# Patient Record
Sex: MALE | Race: WHITE | NOT HISPANIC OR LATINO | Employment: OTHER | URBAN - METROPOLITAN AREA
[De-identification: names, ages, dates, MRNs, and addresses within clinical notes are randomized per-mention and may not be internally consistent; named-entity substitution may affect disease eponyms.]

---

## 2024-09-12 ENCOUNTER — APPOINTMENT (OUTPATIENT)
Dept: RADIOLOGY | Facility: HOSPITAL | Age: 69
DRG: 871 | End: 2024-09-12
Payer: MEDICARE

## 2024-09-12 ENCOUNTER — HOSPITAL ENCOUNTER (INPATIENT)
Facility: HOSPITAL | Age: 69
DRG: 871 | End: 2024-09-12
Attending: INTERNAL MEDICINE | Admitting: SURGERY
Payer: MEDICARE

## 2024-09-12 ENCOUNTER — PREP FOR PROCEDURE (OUTPATIENT)
Dept: OPERATING ROOM | Facility: HOSPITAL | Age: 69
End: 2024-09-12
Payer: MEDICARE

## 2024-09-12 ENCOUNTER — APPOINTMENT (OUTPATIENT)
Dept: CARDIOLOGY | Facility: HOSPITAL | Age: 69
DRG: 871 | End: 2024-09-12
Payer: MEDICARE

## 2024-09-12 ENCOUNTER — DOCUMENTATION (OUTPATIENT)
Dept: OPERATING ROOM | Facility: HOSPITAL | Age: 69
End: 2024-09-12
Payer: MEDICARE

## 2024-09-12 DIAGNOSIS — N17.9 AKI (ACUTE KIDNEY INJURY) (CMS-HCC): ICD-10-CM

## 2024-09-12 DIAGNOSIS — N20.1 OBSTRUCTION OF LEFT URETEROPELVIC JUNCTION (UPJ) DUE TO STONE: ICD-10-CM

## 2024-09-12 DIAGNOSIS — N20.1 OBSTRUCTION OF LEFT URETEROPELVIC JUNCTION (UPJ) DUE TO STONE: Primary | ICD-10-CM

## 2024-09-12 DIAGNOSIS — N20.0 KIDNEY STONE: Primary | ICD-10-CM

## 2024-09-12 DIAGNOSIS — A41.9 SEVERE SEPSIS: ICD-10-CM

## 2024-09-12 DIAGNOSIS — Z43.6 ATTENTION TO NEPHROSTOMY (MULTI): ICD-10-CM

## 2024-09-12 DIAGNOSIS — R65.20 SEVERE SEPSIS: ICD-10-CM

## 2024-09-12 LAB
ALBUMIN SERPL BCP-MCNC: 2.7 G/DL (ref 3.4–5)
ALBUMIN SERPL BCP-MCNC: 3.4 G/DL (ref 3.4–5)
ALP SERPL-CCNC: 77 U/L (ref 33–136)
ALT SERPL W P-5'-P-CCNC: 8 U/L (ref 10–52)
ANION GAP BLDA CALCULATED.4IONS-SCNC: 16 MMO/L (ref 10–25)
ANION GAP BLDA CALCULATED.4IONS-SCNC: 21 MMO/L (ref 10–25)
ANION GAP BLDV CALCULATED.4IONS-SCNC: 15 MMOL/L (ref 10–25)
ANION GAP SERPL CALC-SCNC: 17 MMOL/L (ref 10–20)
ANION GAP SERPL CALC-SCNC: 17 MMOL/L (ref 10–20)
APPEARANCE UR: ABNORMAL
AST SERPL W P-5'-P-CCNC: 28 U/L (ref 9–39)
BACTERIA #/AREA URNS AUTO: ABNORMAL /HPF
BASE EXCESS BLDA CALC-SCNC: -10.3 MMOL/L (ref -2–3)
BASE EXCESS BLDA CALC-SCNC: -11.4 MMOL/L (ref -2–3)
BASE EXCESS BLDV CALC-SCNC: -1 MMOL/L (ref -2–3)
BASOPHILS # BLD AUTO: 0.02 X10*3/UL (ref 0–0.1)
BASOPHILS NFR BLD AUTO: 0.8 %
BILIRUB SERPL-MCNC: 2.2 MG/DL (ref 0–1.2)
BILIRUB UR STRIP.AUTO-MCNC: NEGATIVE MG/DL
BNP SERPL-MCNC: 98 PG/ML (ref 0–99)
BODY TEMPERATURE: 37 DEGREES CELSIUS
BUN SERPL-MCNC: 32 MG/DL (ref 6–23)
BUN SERPL-MCNC: 35 MG/DL (ref 6–23)
CA-I BLDA-SCNC: 0.96 MMOL/L (ref 1.1–1.33)
CA-I BLDA-SCNC: 0.99 MMOL/L (ref 1.1–1.33)
CA-I BLDV-SCNC: 1.15 MMOL/L (ref 1.1–1.33)
CALCIUM SERPL-MCNC: 7 MG/DL (ref 8.6–10.3)
CALCIUM SERPL-MCNC: 8.4 MG/DL (ref 8.6–10.3)
CARDIAC TROPONIN I PNL SERPL HS: 50 NG/L (ref 0–20)
CHLORIDE BLDA-SCNC: 104 MMOL/L (ref 98–107)
CHLORIDE BLDA-SCNC: 109 MMOL/L (ref 98–107)
CHLORIDE BLDV-SCNC: 102 MMOL/L (ref 98–107)
CHLORIDE SERPL-SCNC: 103 MMOL/L (ref 98–107)
CHLORIDE SERPL-SCNC: 105 MMOL/L (ref 98–107)
CO2 SERPL-SCNC: 16 MMOL/L (ref 21–32)
CO2 SERPL-SCNC: 21 MMOL/L (ref 21–32)
COLOR UR: ABNORMAL
CREAT SERPL-MCNC: 2.32 MG/DL (ref 0.5–1.3)
CREAT SERPL-MCNC: 2.95 MG/DL (ref 0.5–1.3)
EGFRCR SERPLBLD CKD-EPI 2021: 22 ML/MIN/1.73M*2
EGFRCR SERPLBLD CKD-EPI 2021: 30 ML/MIN/1.73M*2
EOSINOPHIL # BLD AUTO: 0.25 X10*3/UL (ref 0–0.7)
EOSINOPHIL NFR BLD AUTO: 10.1 %
ERYTHROCYTE [DISTWIDTH] IN BLOOD BY AUTOMATED COUNT: 13.8 % (ref 11.5–14.5)
FLUAV RNA RESP QL NAA+PROBE: NOT DETECTED
FLUBV RNA RESP QL NAA+PROBE: NOT DETECTED
GLUCOSE BLD MANUAL STRIP-MCNC: 137 MG/DL (ref 74–99)
GLUCOSE BLD MANUAL STRIP-MCNC: 67 MG/DL (ref 74–99)
GLUCOSE BLDA-MCNC: 114 MG/DL (ref 74–99)
GLUCOSE BLDA-MCNC: 154 MG/DL (ref 74–99)
GLUCOSE BLDV-MCNC: 136 MG/DL (ref 74–99)
GLUCOSE SERPL-MCNC: 128 MG/DL (ref 74–99)
GLUCOSE SERPL-MCNC: 142 MG/DL (ref 74–99)
GLUCOSE UR STRIP.AUTO-MCNC: NORMAL MG/DL
HCO3 BLDA-SCNC: 13.3 MMOL/L (ref 22–26)
HCO3 BLDA-SCNC: 13.6 MMOL/L (ref 22–26)
HCO3 BLDV-SCNC: 22 MMOL/L (ref 22–26)
HCT VFR BLD AUTO: 36.4 % (ref 41–52)
HCT VFR BLD EST: 31 % (ref 41–52)
HCT VFR BLD EST: 39 % (ref 41–52)
HCT VFR BLD EST: 43 % (ref 41–52)
HGB BLD-MCNC: 12.5 G/DL (ref 13.5–17.5)
HGB BLDA-MCNC: 10.4 G/DL (ref 13.5–17.5)
HGB BLDA-MCNC: 14.3 G/DL (ref 13.5–17.5)
HGB BLDV-MCNC: 13.1 G/DL (ref 13.5–17.5)
HOLD SPECIMEN: NORMAL
HOLD SPECIMEN: NORMAL
IMM GRANULOCYTES # BLD AUTO: 0.01 X10*3/UL (ref 0–0.7)
IMM GRANULOCYTES NFR BLD AUTO: 0.4 % (ref 0–0.9)
INHALED O2 CONCENTRATION: 24 %
INHALED O2 CONCENTRATION: 30 %
INHALED O2 CONCENTRATION: 36 %
INR PPP: 1.7 (ref 0.9–1.1)
KETONES UR STRIP.AUTO-MCNC: ABNORMAL MG/DL
LACTATE BLDA-SCNC: 3.5 MMOL/L (ref 0.4–2)
LACTATE BLDA-SCNC: 5.7 MMOL/L (ref 0.4–2)
LACTATE BLDV-SCNC: 4.3 MMOL/L (ref 0.4–2)
LACTATE SERPL-SCNC: 4 MMOL/L (ref 0.4–2)
LACTATE SERPL-SCNC: 4.4 MMOL/L (ref 0.4–2)
LACTATE SERPL-SCNC: 4.6 MMOL/L (ref 0.4–2)
LEUKOCYTE ESTERASE UR QL STRIP.AUTO: ABNORMAL
LIPASE SERPL-CCNC: 27 U/L (ref 9–82)
LYMPHOCYTES # BLD AUTO: 0.14 X10*3/UL (ref 1.2–4.8)
LYMPHOCYTES NFR BLD AUTO: 5.7 %
MCH RBC QN AUTO: 34 PG (ref 26–34)
MCHC RBC AUTO-ENTMCNC: 34.3 G/DL (ref 32–36)
MCV RBC AUTO: 99 FL (ref 80–100)
MONOCYTES # BLD AUTO: 0.04 X10*3/UL (ref 0.1–1)
MONOCYTES NFR BLD AUTO: 1.6 %
MUCOUS THREADS #/AREA URNS AUTO: ABNORMAL /LPF
NEUTROPHILS # BLD AUTO: 2.01 X10*3/UL (ref 1.2–7.7)
NEUTROPHILS NFR BLD AUTO: 81.4 %
NITRITE UR QL STRIP.AUTO: NEGATIVE
NRBC BLD-RTO: 3.2 /100 WBCS (ref 0–0)
OXYHGB MFR BLDA: 90.7 % (ref 94–98)
OXYHGB MFR BLDA: 92.3 % (ref 94–98)
OXYHGB MFR BLDV: 65.5 % (ref 45–75)
PCO2 BLDA: 24 MM HG (ref 38–42)
PCO2 BLDA: 27 MM HG (ref 38–42)
PCO2 BLDV: 31 MM HG (ref 41–51)
PH BLDA: 7.3 PH (ref 7.38–7.42)
PH BLDA: 7.36 PH (ref 7.38–7.42)
PH BLDV: 7.46 PH (ref 7.33–7.43)
PH UR STRIP.AUTO: 5.5 [PH]
PHOSPHATE SERPL-MCNC: 3.2 MG/DL (ref 2.5–4.9)
PLATELET # BLD AUTO: 184 X10*3/UL (ref 150–450)
PO2 BLDA: 70 MM HG (ref 85–95)
PO2 BLDA: 70 MM HG (ref 85–95)
PO2 BLDV: 39 MM HG (ref 35–45)
POTASSIUM BLDA-SCNC: 2.9 MMOL/L (ref 3.5–5.3)
POTASSIUM BLDA-SCNC: 4 MMOL/L (ref 3.5–5.3)
POTASSIUM BLDV-SCNC: 4.2 MMOL/L (ref 3.5–5.3)
POTASSIUM SERPL-SCNC: 3.9 MMOL/L (ref 3.5–5.3)
POTASSIUM SERPL-SCNC: 4.1 MMOL/L (ref 3.5–5.3)
PROT SERPL-MCNC: 5.6 G/DL (ref 6.4–8.2)
PROT UR STRIP.AUTO-MCNC: ABNORMAL MG/DL
PROTHROMBIN TIME: 19.5 SECONDS (ref 9.8–12.8)
RBC # BLD AUTO: 3.68 X10*6/UL (ref 4.5–5.9)
RBC # UR STRIP.AUTO: ABNORMAL /UL
RBC #/AREA URNS AUTO: >20 /HPF
SAO2 % BLDA: 93 % (ref 94–100)
SAO2 % BLDA: 94 % (ref 94–100)
SAO2 % BLDV: 68 % (ref 45–75)
SARS-COV-2 RNA RESP QL NAA+PROBE: NOT DETECTED
SODIUM BLDA-SCNC: 134 MMOL/L (ref 136–145)
SODIUM BLDA-SCNC: 136 MMOL/L (ref 136–145)
SODIUM BLDV-SCNC: 135 MMOL/L (ref 136–145)
SODIUM SERPL-SCNC: 134 MMOL/L (ref 136–145)
SODIUM SERPL-SCNC: 137 MMOL/L (ref 136–145)
SP GR UR STRIP.AUTO: 1.02
UROBILINOGEN UR STRIP.AUTO-MCNC: NORMAL MG/DL
WBC # BLD AUTO: 2.5 X10*3/UL (ref 4.4–11.3)
WBC #/AREA URNS AUTO: >50 /HPF

## 2024-09-12 PROCEDURE — 2500000004 HC RX 250 GENERAL PHARMACY W/ HCPCS (ALT 636 FOR OP/ED)

## 2024-09-12 PROCEDURE — 99222 1ST HOSP IP/OBS MODERATE 55: CPT | Performed by: NURSE PRACTITIONER

## 2024-09-12 PROCEDURE — 99291 CRITICAL CARE FIRST HOUR: CPT | Performed by: INTERNAL MEDICINE

## 2024-09-12 PROCEDURE — 85025 COMPLETE CBC W/AUTO DIFF WBC: CPT | Performed by: INTERNAL MEDICINE

## 2024-09-12 PROCEDURE — 36415 COLL VENOUS BLD VENIPUNCTURE: CPT | Performed by: INTERNAL MEDICINE

## 2024-09-12 PROCEDURE — 0T9430Z DRAINAGE OF LEFT KIDNEY PELVIS WITH DRAINAGE DEVICE, PERCUTANEOUS APPROACH: ICD-10-PCS | Performed by: RADIOLOGY

## 2024-09-12 PROCEDURE — 83605 ASSAY OF LACTIC ACID: CPT | Performed by: INTERNAL MEDICINE

## 2024-09-12 PROCEDURE — 81001 URINALYSIS AUTO W/SCOPE: CPT | Performed by: INTERNAL MEDICINE

## 2024-09-12 PROCEDURE — 36556 INSERT NON-TUNNEL CV CATH: CPT | Performed by: NURSE PRACTITIONER

## 2024-09-12 PROCEDURE — 2500000005 HC RX 250 GENERAL PHARMACY W/O HCPCS: Performed by: INTERNAL MEDICINE

## 2024-09-12 PROCEDURE — 96374 THER/PROPH/DIAG INJ IV PUSH: CPT | Mod: 59

## 2024-09-12 PROCEDURE — 84484 ASSAY OF TROPONIN QUANT: CPT | Performed by: INTERNAL MEDICINE

## 2024-09-12 PROCEDURE — 71045 X-RAY EXAM CHEST 1 VIEW: CPT

## 2024-09-12 PROCEDURE — 02HV33Z INSERTION OF INFUSION DEVICE INTO SUPERIOR VENA CAVA, PERCUTANEOUS APPROACH: ICD-10-PCS | Performed by: NURSE PRACTITIONER

## 2024-09-12 PROCEDURE — 2500000001 HC RX 250 WO HCPCS SELF ADMINISTERED DRUGS (ALT 637 FOR MEDICARE OP): Performed by: NURSE PRACTITIONER

## 2024-09-12 PROCEDURE — 96365 THER/PROPH/DIAG IV INF INIT: CPT | Mod: 59

## 2024-09-12 PROCEDURE — 36620 INSERTION CATHETER ARTERY: CPT | Performed by: NURSE PRACTITIONER

## 2024-09-12 PROCEDURE — 37799 UNLISTED PX VASCULAR SURGERY: CPT | Performed by: NURSE PRACTITIONER

## 2024-09-12 PROCEDURE — 2550000001 HC RX 255 CONTRASTS: Performed by: INTERNAL MEDICINE

## 2024-09-12 PROCEDURE — 2720000007 HC OR 272 NO HCPCS

## 2024-09-12 PROCEDURE — 71045 X-RAY EXAM CHEST 1 VIEW: CPT | Performed by: RADIOLOGY

## 2024-09-12 PROCEDURE — 99152 MOD SED SAME PHYS/QHP 5/>YRS: CPT

## 2024-09-12 PROCEDURE — 96366 THER/PROPH/DIAG IV INF ADDON: CPT | Mod: 59

## 2024-09-12 PROCEDURE — 2500000004 HC RX 250 GENERAL PHARMACY W/ HCPCS (ALT 636 FOR OP/ED): Performed by: INTERNAL MEDICINE

## 2024-09-12 PROCEDURE — 87086 URINE CULTURE/COLONY COUNT: CPT | Mod: AHULAB | Performed by: INTERNAL MEDICINE

## 2024-09-12 PROCEDURE — 2500000005 HC RX 250 GENERAL PHARMACY W/O HCPCS: Performed by: EMERGENCY MEDICINE

## 2024-09-12 PROCEDURE — 76942 ECHO GUIDE FOR BIOPSY: CPT

## 2024-09-12 PROCEDURE — 84132 ASSAY OF SERUM POTASSIUM: CPT | Performed by: NURSE PRACTITIONER

## 2024-09-12 PROCEDURE — 87635 SARS-COV-2 COVID-19 AMP PRB: CPT | Performed by: INTERNAL MEDICINE

## 2024-09-12 PROCEDURE — 71046 X-RAY EXAM CHEST 2 VIEWS: CPT

## 2024-09-12 PROCEDURE — 83880 ASSAY OF NATRIURETIC PEPTIDE: CPT | Performed by: INTERNAL MEDICINE

## 2024-09-12 PROCEDURE — 99291 CRITICAL CARE FIRST HOUR: CPT | Performed by: NURSE PRACTITIONER

## 2024-09-12 PROCEDURE — 2020000001 HC ICU ROOM DAILY

## 2024-09-12 PROCEDURE — 85610 PROTHROMBIN TIME: CPT

## 2024-09-12 PROCEDURE — 71045 X-RAY EXAM CHEST 1 VIEW: CPT | Performed by: STUDENT IN AN ORGANIZED HEALTH CARE EDUCATION/TRAINING PROGRAM

## 2024-09-12 PROCEDURE — C1894 INTRO/SHEATH, NON-LASER: HCPCS

## 2024-09-12 PROCEDURE — 93005 ELECTROCARDIOGRAM TRACING: CPT

## 2024-09-12 PROCEDURE — 96367 TX/PROPH/DG ADDL SEQ IV INF: CPT | Mod: 59

## 2024-09-12 PROCEDURE — 50432 PLMT NEPHROSTOMY CATHETER: CPT

## 2024-09-12 PROCEDURE — 83690 ASSAY OF LIPASE: CPT | Performed by: INTERNAL MEDICINE

## 2024-09-12 PROCEDURE — 2500000005 HC RX 250 GENERAL PHARMACY W/O HCPCS: Performed by: NURSE PRACTITIONER

## 2024-09-12 PROCEDURE — 96361 HYDRATE IV INFUSION ADD-ON: CPT | Mod: 59

## 2024-09-12 PROCEDURE — 2500000004 HC RX 250 GENERAL PHARMACY W/ HCPCS (ALT 636 FOR OP/ED): Performed by: NURSE PRACTITIONER

## 2024-09-12 PROCEDURE — 84132 ASSAY OF SERUM POTASSIUM: CPT | Performed by: INTERNAL MEDICINE

## 2024-09-12 PROCEDURE — 87077 CULTURE AEROBIC IDENTIFY: CPT | Mod: AHULAB | Performed by: INTERNAL MEDICINE

## 2024-09-12 PROCEDURE — 2550000001 HC RX 255 CONTRASTS: Performed by: RADIOLOGY

## 2024-09-12 PROCEDURE — 74177 CT ABD & PELVIS W/CONTRAST: CPT | Performed by: RADIOLOGY

## 2024-09-12 PROCEDURE — 2500000004 HC RX 250 GENERAL PHARMACY W/ HCPCS (ALT 636 FOR OP/ED): Performed by: RADIOLOGY

## 2024-09-12 PROCEDURE — 94640 AIRWAY INHALATION TREATMENT: CPT

## 2024-09-12 PROCEDURE — C1729 CATH, DRAINAGE: HCPCS

## 2024-09-12 PROCEDURE — 2500000004 HC RX 250 GENERAL PHARMACY W/ HCPCS (ALT 636 FOR OP/ED): Performed by: EMERGENCY MEDICINE

## 2024-09-12 PROCEDURE — 82947 ASSAY GLUCOSE BLOOD QUANT: CPT

## 2024-09-12 PROCEDURE — 2500000005 HC RX 250 GENERAL PHARMACY W/O HCPCS: Performed by: RADIOLOGY

## 2024-09-12 PROCEDURE — 50432 PLMT NEPHROSTOMY CATHETER: CPT | Performed by: RADIOLOGY

## 2024-09-12 PROCEDURE — 71046 X-RAY EXAM CHEST 2 VIEWS: CPT | Performed by: RADIOLOGY

## 2024-09-12 PROCEDURE — 74177 CT ABD & PELVIS W/CONTRAST: CPT

## 2024-09-12 PROCEDURE — 2500000002 HC RX 250 W HCPCS SELF ADMINISTERED DRUGS (ALT 637 FOR MEDICARE OP, ALT 636 FOR OP/ED): Performed by: NURSE PRACTITIONER

## 2024-09-12 RX ORDER — HEPARIN SODIUM 5000 [USP'U]/ML
5000 INJECTION, SOLUTION INTRAVENOUS; SUBCUTANEOUS EVERY 8 HOURS
Status: DISCONTINUED | OUTPATIENT
Start: 2024-09-12 | End: 2024-09-17 | Stop reason: HOSPADM

## 2024-09-12 RX ORDER — CARBIDOPA AND LEVODOPA 25; 100 MG/1; MG/1
1.5 TABLET ORAL EVERY 8 HOURS
Status: DISCONTINUED | OUTPATIENT
Start: 2024-09-12 | End: 2024-09-12

## 2024-09-12 RX ORDER — ASPIRIN 81 MG/1
81 TABLET ORAL DAILY
COMMUNITY

## 2024-09-12 RX ORDER — IPRATROPIUM BROMIDE AND ALBUTEROL SULFATE 2.5; .5 MG/3ML; MG/3ML
3 SOLUTION RESPIRATORY (INHALATION) EVERY 6 HOURS PRN
Status: DISCONTINUED | OUTPATIENT
Start: 2024-09-12 | End: 2024-09-15

## 2024-09-12 RX ORDER — NOREPINEPHRINE BITARTRATE/D5W 8 MG/250ML
0-.5 PLASTIC BAG, INJECTION (ML) INTRAVENOUS CONTINUOUS
Status: DISCONTINUED | OUTPATIENT
Start: 2024-09-12 | End: 2024-09-15

## 2024-09-12 RX ORDER — ACETAMINOPHEN 325 MG/1
650 TABLET ORAL EVERY 4 HOURS PRN
Status: DISCONTINUED | OUTPATIENT
Start: 2024-09-12 | End: 2024-09-17 | Stop reason: HOSPADM

## 2024-09-12 RX ORDER — CARBIDOPA AND LEVODOPA 25; 100 MG/1; MG/1
1.5 TABLET ORAL 4 TIMES DAILY
Status: DISCONTINUED | OUTPATIENT
Start: 2024-09-12 | End: 2024-09-17 | Stop reason: HOSPADM

## 2024-09-12 RX ORDER — ACETAMINOPHEN 500 MG
2000 TABLET ORAL DAILY
COMMUNITY

## 2024-09-12 RX ORDER — TRAZODONE HYDROCHLORIDE 50 MG/1
50 TABLET ORAL NIGHTLY PRN
Status: DISCONTINUED | OUTPATIENT
Start: 2024-09-12 | End: 2024-09-14

## 2024-09-12 RX ORDER — ATORVASTATIN CALCIUM 40 MG/1
40 TABLET, FILM COATED ORAL NIGHTLY
Status: DISCONTINUED | OUTPATIENT
Start: 2024-09-12 | End: 2024-09-17 | Stop reason: HOSPADM

## 2024-09-12 RX ORDER — SODIUM CHLORIDE, SODIUM LACTATE, POTASSIUM CHLORIDE, CALCIUM CHLORIDE 600; 310; 30; 20 MG/100ML; MG/100ML; MG/100ML; MG/100ML
100 INJECTION, SOLUTION INTRAVENOUS CONTINUOUS
Status: DISCONTINUED | OUTPATIENT
Start: 2024-09-12 | End: 2024-09-12 | Stop reason: SDUPTHER

## 2024-09-12 RX ORDER — FAMOTIDINE 20 MG/1
20 TABLET, FILM COATED ORAL DAILY
Status: DISCONTINUED | OUTPATIENT
Start: 2024-09-12 | End: 2024-09-17 | Stop reason: HOSPADM

## 2024-09-12 RX ORDER — ASPIRIN 81 MG/1
81 TABLET ORAL DAILY
Status: DISCONTINUED | OUTPATIENT
Start: 2024-09-13 | End: 2024-09-17 | Stop reason: HOSPADM

## 2024-09-12 RX ORDER — ATORVASTATIN CALCIUM 40 MG/1
40 TABLET, FILM COATED ORAL NIGHTLY
COMMUNITY

## 2024-09-12 RX ORDER — FENTANYL CITRATE 50 UG/ML
INJECTION, SOLUTION INTRAMUSCULAR; INTRAVENOUS
Status: COMPLETED
Start: 2024-09-12 | End: 2024-09-12

## 2024-09-12 RX ORDER — POLYETHYLENE GLYCOL 3350 17 G/17G
17 POWDER, FOR SOLUTION ORAL DAILY
Status: DISCONTINUED | OUTPATIENT
Start: 2024-09-12 | End: 2024-09-17 | Stop reason: HOSPADM

## 2024-09-12 RX ORDER — CARBIDOPA AND LEVODOPA 25; 100 MG/1; MG/1
1.5 TABLET, ORALLY DISINTEGRATING ORAL 4 TIMES DAILY
COMMUNITY

## 2024-09-12 RX ORDER — ACETAMINOPHEN 325 MG/1
650 TABLET ORAL ONCE
Status: COMPLETED | OUTPATIENT
Start: 2024-09-12 | End: 2024-09-12

## 2024-09-12 RX ORDER — FENTANYL CITRATE 50 UG/ML
25 INJECTION, SOLUTION INTRAMUSCULAR; INTRAVENOUS ONCE
Status: COMPLETED | OUTPATIENT
Start: 2024-09-12 | End: 2024-09-12

## 2024-09-12 RX ORDER — PYRIDOXINE HCL (VITAMIN B6) 100 MG
100 TABLET ORAL DAILY
COMMUNITY

## 2024-09-12 RX ORDER — IPRATROPIUM BROMIDE AND ALBUTEROL SULFATE 2.5; .5 MG/3ML; MG/3ML
3 SOLUTION RESPIRATORY (INHALATION) ONCE
Status: COMPLETED | OUTPATIENT
Start: 2024-09-12 | End: 2024-09-12

## 2024-09-12 RX ORDER — ALLOPURINOL 100 MG/1
100 TABLET ORAL DAILY
COMMUNITY

## 2024-09-12 RX ORDER — IPRATROPIUM BROMIDE AND ALBUTEROL SULFATE 2.5; .5 MG/3ML; MG/3ML
3 SOLUTION RESPIRATORY (INHALATION) ONCE
Status: DISCONTINUED | OUTPATIENT
Start: 2024-09-12 | End: 2024-09-14

## 2024-09-12 RX ORDER — ACETAMINOPHEN 500 MG
1000 TABLET ORAL DAILY
COMMUNITY

## 2024-09-12 RX ORDER — DEXTROSE, SODIUM CHLORIDE, SODIUM LACTATE, POTASSIUM CHLORIDE, AND CALCIUM CHLORIDE 5; .6; .31; .03; .02 G/100ML; G/100ML; G/100ML; G/100ML; G/100ML
75 INJECTION, SOLUTION INTRAVENOUS CONTINUOUS
Status: DISCONTINUED | OUTPATIENT
Start: 2024-09-12 | End: 2024-09-12

## 2024-09-12 RX ORDER — CARBIDOPA AND LEVODOPA 25; 100 MG/1; MG/1
1.5 TABLET ORAL 4 TIMES DAILY
Status: DISCONTINUED | OUTPATIENT
Start: 2024-09-12 | End: 2024-09-12

## 2024-09-12 RX ORDER — LIDOCAINE HYDROCHLORIDE 20 MG/ML
1 JELLY TOPICAL ONCE
Status: COMPLETED | OUTPATIENT
Start: 2024-09-12 | End: 2024-09-12

## 2024-09-12 RX ORDER — TRAZODONE HYDROCHLORIDE 50 MG/1
50 TABLET ORAL NIGHTLY
COMMUNITY

## 2024-09-12 RX ORDER — CARBIDOPA AND LEVODOPA 25; 100 MG/1; MG/1
1.5 TABLET ORAL
Status: DISCONTINUED | OUTPATIENT
Start: 2024-09-12 | End: 2024-09-12 | Stop reason: WASHOUT

## 2024-09-12 RX ORDER — SODIUM CHLORIDE, SODIUM LACTATE, POTASSIUM CHLORIDE, CALCIUM CHLORIDE 600; 310; 30; 20 MG/100ML; MG/100ML; MG/100ML; MG/100ML
200 INJECTION, SOLUTION INTRAVENOUS CONTINUOUS
Status: ACTIVE | OUTPATIENT
Start: 2024-09-12 | End: 2024-09-12

## 2024-09-12 RX ORDER — FENTANYL CITRATE 50 UG/ML
INJECTION, SOLUTION INTRAMUSCULAR; INTRAVENOUS AS NEEDED
Status: DISCONTINUED | OUTPATIENT
Start: 2024-09-12 | End: 2024-09-17 | Stop reason: HOSPADM

## 2024-09-12 RX ORDER — LIDOCAINE HYDROCHLORIDE 20 MG/ML
INJECTION, SOLUTION INFILTRATION; PERINEURAL AS NEEDED
Status: DISCONTINUED | OUTPATIENT
Start: 2024-09-12 | End: 2024-09-17 | Stop reason: HOSPADM

## 2024-09-12 RX ORDER — NOREPINEPHRINE BITARTRATE/D5W 8 MG/250ML
0-.2 PLASTIC BAG, INJECTION (ML) INTRAVENOUS CONTINUOUS
Status: DISCONTINUED | OUTPATIENT
Start: 2024-09-12 | End: 2024-09-12 | Stop reason: ALTCHOICE

## 2024-09-12 RX ORDER — LISINOPRIL 10 MG/1
10 TABLET ORAL DAILY
COMMUNITY

## 2024-09-12 RX ADMIN — IOHEXOL 75 ML: 350 INJECTION, SOLUTION INTRAVENOUS at 01:28

## 2024-09-12 RX ADMIN — SODIUM CHLORIDE, POTASSIUM CHLORIDE, SODIUM LACTATE AND CALCIUM CHLORIDE 1000 ML: 600; 310; 30; 20 INJECTION, SOLUTION INTRAVENOUS at 12:35

## 2024-09-12 RX ADMIN — ACETAMINOPHEN 650 MG: 325 TABLET ORAL at 00:35

## 2024-09-12 RX ADMIN — FENTANYL CITRATE 25 MCG: 50 INJECTION, SOLUTION INTRAMUSCULAR; INTRAVENOUS at 08:23

## 2024-09-12 RX ADMIN — PIPERACILLIN SODIUM AND TAZOBACTAM SODIUM 3.38 G: 3; .375 INJECTION, SOLUTION INTRAVENOUS at 19:55

## 2024-09-12 RX ADMIN — CARBIDOPA AND LEVODOPA 1.5 TABLET: 25; 100 TABLET ORAL at 13:37

## 2024-09-12 RX ADMIN — HYDROCORTISONE SODIUM SUCCINATE 50 MG: 100 INJECTION, POWDER, FOR SOLUTION INTRAMUSCULAR; INTRAVENOUS at 08:36

## 2024-09-12 RX ADMIN — LIDOCAINE HYDROCHLORIDE 10 ML: 20 INJECTION, SOLUTION INFILTRATION; PERINEURAL at 10:22

## 2024-09-12 RX ADMIN — SODIUM CHLORIDE, POTASSIUM CHLORIDE, SODIUM LACTATE AND CALCIUM CHLORIDE 500 ML: 600; 310; 30; 20 INJECTION, SOLUTION INTRAVENOUS at 05:42

## 2024-09-12 RX ADMIN — LIDOCAINE HYDROCHLORIDE 1 APPLICATION: 20 JELLY TOPICAL at 04:01

## 2024-09-12 RX ADMIN — HYDROCORTISONE SODIUM SUCCINATE 50 MG: 100 INJECTION, POWDER, FOR SOLUTION INTRAMUSCULAR; INTRAVENOUS at 13:37

## 2024-09-12 RX ADMIN — SODIUM CHLORIDE 1000 ML: 9 INJECTION, SOLUTION INTRAVENOUS at 02:19

## 2024-09-12 RX ADMIN — HYDROCORTISONE SODIUM SUCCINATE 50 MG: 100 INJECTION, POWDER, FOR SOLUTION INTRAMUSCULAR; INTRAVENOUS at 19:55

## 2024-09-12 RX ADMIN — SODIUM CHLORIDE, POTASSIUM CHLORIDE, SODIUM LACTATE AND CALCIUM CHLORIDE 200 ML/HR: 600; 310; 30; 20 INJECTION, SOLUTION INTRAVENOUS at 15:04

## 2024-09-12 RX ADMIN — IPRATROPIUM BROMIDE AND ALBUTEROL SULFATE 3 ML: .5; 3 SOLUTION RESPIRATORY (INHALATION) at 17:34

## 2024-09-12 RX ADMIN — NOREPINEPHRINE BITARTRATE 0.15 MCG/KG/MIN: 8 INJECTION, SOLUTION INTRAVENOUS at 16:55

## 2024-09-12 RX ADMIN — SODIUM CHLORIDE, POTASSIUM CHLORIDE, SODIUM LACTATE AND CALCIUM CHLORIDE 100 ML/HR: 600; 310; 30; 20 INJECTION, SOLUTION INTRAVENOUS at 12:37

## 2024-09-12 RX ADMIN — VASOPRESSIN 0.03 UNITS/MIN: 0.2 INJECTION INTRAVENOUS at 09:34

## 2024-09-12 RX ADMIN — PIPERACILLIN SODIUM AND TAZOBACTAM SODIUM 4.5 G: 4; .5 INJECTION, SOLUTION INTRAVENOUS at 00:35

## 2024-09-12 RX ADMIN — VASOPRESSIN 0.03 UNITS/MIN: 0.2 INJECTION INTRAVENOUS at 22:19

## 2024-09-12 RX ADMIN — VANCOMYCIN HYDROCHLORIDE 2 G: 10 INJECTION, POWDER, LYOPHILIZED, FOR SOLUTION INTRAVENOUS at 01:15

## 2024-09-12 RX ADMIN — CARBIDOPA AND LEVODOPA 1.5 TABLET: 25; 100 TABLET ORAL at 22:58

## 2024-09-12 RX ADMIN — SODIUM CHLORIDE, POTASSIUM CHLORIDE, SODIUM LACTATE AND CALCIUM CHLORIDE 1000 ML: 600; 310; 30; 20 INJECTION, SOLUTION INTRAVENOUS at 03:24

## 2024-09-12 RX ADMIN — SODIUM CHLORIDE 1000 ML: 9 INJECTION, SOLUTION INTRAVENOUS at 03:24

## 2024-09-12 RX ADMIN — NOREPINEPHRINE BITARTRATE 0.25 MCG/KG/MIN: 8 INJECTION, SOLUTION INTRAVENOUS at 09:34

## 2024-09-12 RX ADMIN — IPRATROPIUM BROMIDE AND ALBUTEROL SULFATE 3 ML: .5; 3 SOLUTION RESPIRATORY (INHALATION) at 15:59

## 2024-09-12 RX ADMIN — FENTANYL CITRATE 50 MCG: 50 INJECTION, SOLUTION INTRAMUSCULAR; INTRAVENOUS at 10:27

## 2024-09-12 RX ADMIN — SODIUM CHLORIDE 1000 ML: 9 INJECTION, SOLUTION INTRAVENOUS at 01:22

## 2024-09-12 RX ADMIN — TRAZODONE HYDROCHLORIDE 50 MG: 50 TABLET ORAL at 22:59

## 2024-09-12 RX ADMIN — CARBIDOPA AND LEVODOPA 1.5 TABLET: 25; 100 TABLET ORAL at 08:52

## 2024-09-12 RX ADMIN — HEPARIN SODIUM 5000 UNITS: 5000 INJECTION INTRAVENOUS; SUBCUTANEOUS at 15:13

## 2024-09-12 RX ADMIN — NOREPINEPHRINE BITARTRATE 0.01 MCG/KG/MIN: 8 INJECTION, SOLUTION INTRAVENOUS at 03:04

## 2024-09-12 RX ADMIN — IOHEXOL 10 ML: 350 INJECTION, SOLUTION INTRAVENOUS at 10:47

## 2024-09-12 RX ADMIN — ATORVASTATIN CALCIUM 40 MG: 40 TABLET, FILM COATED ORAL at 22:58

## 2024-09-12 RX ADMIN — PIPERACILLIN SODIUM AND TAZOBACTAM SODIUM 3.38 G: 3; .375 INJECTION, SOLUTION INTRAVENOUS at 08:36

## 2024-09-12 RX ADMIN — CARBIDOPA AND LEVODOPA 1.5 TABLET: 25; 100 TABLET ORAL at 18:59

## 2024-09-12 RX ADMIN — SODIUM CHLORIDE 1000 ML: 9 INJECTION, SOLUTION INTRAVENOUS at 00:35

## 2024-09-12 RX ADMIN — FAMOTIDINE 20 MG: 20 TABLET, FILM COATED ORAL at 16:07

## 2024-09-12 RX ADMIN — VASOPRESSIN 0.03 UNITS/MIN: 0.2 INJECTION INTRAVENOUS at 05:58

## 2024-09-12 RX ADMIN — Medication 3 L/MIN: at 15:59

## 2024-09-12 RX ADMIN — HEPARIN SODIUM 5000 UNITS: 5000 INJECTION INTRAVENOUS; SUBCUTANEOUS at 22:59

## 2024-09-12 RX ADMIN — Medication 3 L/MIN: at 16:00

## 2024-09-12 SDOH — SOCIAL STABILITY: SOCIAL INSECURITY: ABUSE: ADULT

## 2024-09-12 SDOH — SOCIAL STABILITY: SOCIAL INSECURITY: HAVE YOU HAD THOUGHTS OF HARMING ANYONE ELSE?: YES

## 2024-09-12 SDOH — SOCIAL STABILITY: SOCIAL INSECURITY: WERE YOU ABLE TO COMPLETE ALL THE BEHAVIORAL HEALTH SCREENINGS?: YES

## 2024-09-12 SDOH — SOCIAL STABILITY: SOCIAL INSECURITY: ARE THERE ANY APPARENT SIGNS OF INJURIES/BEHAVIORS THAT COULD BE RELATED TO ABUSE/NEGLECT?: NO

## 2024-09-12 SDOH — SOCIAL STABILITY: SOCIAL INSECURITY: DOES ANYONE TRY TO KEEP YOU FROM HAVING/CONTACTING OTHER FRIENDS OR DOING THINGS OUTSIDE YOUR HOME?: NO

## 2024-09-12 SDOH — SOCIAL STABILITY: SOCIAL INSECURITY: DO YOU FEEL UNSAFE GOING BACK TO THE PLACE WHERE YOU ARE LIVING?: NO

## 2024-09-12 SDOH — SOCIAL STABILITY: SOCIAL INSECURITY: HAS ANYONE EVER THREATENED TO HURT YOUR FAMILY OR YOUR PETS?: NO

## 2024-09-12 SDOH — SOCIAL STABILITY: SOCIAL INSECURITY: HAVE YOU HAD ANY THOUGHTS OF HARMING ANYONE ELSE?: NO

## 2024-09-12 SDOH — SOCIAL STABILITY: SOCIAL INSECURITY: ARE YOU OR HAVE YOU BEEN THREATENED OR ABUSED PHYSICALLY, EMOTIONALLY, OR SEXUALLY BY ANYONE?: NO

## 2024-09-12 SDOH — SOCIAL STABILITY: SOCIAL INSECURITY: DO YOU FEEL ANYONE HAS EXPLOITED OR TAKEN ADVANTAGE OF YOU FINANCIALLY OR OF YOUR PERSONAL PROPERTY?: NO

## 2024-09-12 ASSESSMENT — COGNITIVE AND FUNCTIONAL STATUS - GENERAL
DAILY ACTIVITIY SCORE: 19
EATING MEALS: A LITTLE
TOILETING: A LITTLE
TOILETING: A LITTLE
STANDING UP FROM CHAIR USING ARMS: A LITTLE
HELP NEEDED FOR BATHING: A LITTLE
DRESSING REGULAR LOWER BODY CLOTHING: A LITTLE
CLIMB 3 TO 5 STEPS WITH RAILING: A LITTLE
PERSONAL GROOMING: A LITTLE
WALKING IN HOSPITAL ROOM: A LITTLE
TURNING FROM BACK TO SIDE WHILE IN FLAT BAD: A LITTLE
TOILETING: A LITTLE
MOBILITY SCORE: 18
CLIMB 3 TO 5 STEPS WITH RAILING: A LITTLE
MOBILITY SCORE: 18
STANDING UP FROM CHAIR USING ARMS: A LITTLE
DRESSING REGULAR LOWER BODY CLOTHING: A LITTLE
MOBILITY SCORE: 22
TOILETING: A LITTLE
WALKING IN HOSPITAL ROOM: A LITTLE
MOBILITY SCORE: 20
DAILY ACTIVITIY SCORE: 18
DRESSING REGULAR UPPER BODY CLOTHING: A LITTLE
CLIMB 3 TO 5 STEPS WITH RAILING: A LITTLE
MOVING FROM LYING ON BACK TO SITTING ON SIDE OF FLAT BED WITH BEDRAILS: A LITTLE
MOVING TO AND FROM BED TO CHAIR: A LITTLE
EATING MEALS: A LITTLE
PERSONAL GROOMING: A LITTLE
HELP NEEDED FOR BATHING: A LITTLE
MOVING FROM LYING ON BACK TO SITTING ON SIDE OF FLAT BED WITH BEDRAILS: A LITTLE
MOVING TO AND FROM BED TO CHAIR: A LITTLE
WALKING IN HOSPITAL ROOM: A LITTLE
STANDING UP FROM CHAIR USING ARMS: A LITTLE
PERSONAL GROOMING: A LITTLE
DRESSING REGULAR UPPER BODY CLOTHING: A LITTLE
PERSONAL GROOMING: A LITTLE
EATING MEALS: A LITTLE
DAILY ACTIVITIY SCORE: 18
HELP NEEDED FOR BATHING: A LITTLE
DRESSING REGULAR UPPER BODY CLOTHING: A LITTLE
HELP NEEDED FOR BATHING: A LITTLE
CLIMB 3 TO 5 STEPS WITH RAILING: A LITTLE
PATIENT BASELINE BEDBOUND: NO
DRESSING REGULAR LOWER BODY CLOTHING: A LITTLE
TURNING FROM BACK TO SIDE WHILE IN FLAT BAD: A LITTLE
DRESSING REGULAR LOWER BODY CLOTHING: A LITTLE
WALKING IN HOSPITAL ROOM: A LITTLE
DAILY ACTIVITIY SCORE: 18
DRESSING REGULAR UPPER BODY CLOTHING: A LITTLE
MOVING TO AND FROM BED TO CHAIR: A LITTLE

## 2024-09-12 ASSESSMENT — ACTIVITIES OF DAILY LIVING (ADL)
HEARING - RIGHT EAR: FUNCTIONAL
BATHING: INDEPENDENT
TOILETING: INDEPENDENT
GROOMING: INDEPENDENT
WALKS IN HOME: INDEPENDENT
LACK_OF_TRANSPORTATION: NO
HEARING - LEFT EAR: FUNCTIONAL
ADEQUATE_TO_COMPLETE_ADL: YES
JUDGMENT_ADEQUATE_SAFELY_COMPLETE_DAILY_ACTIVITIES: YES
PATIENT'S MEMORY ADEQUATE TO SAFELY COMPLETE DAILY ACTIVITIES?: YES
DRESSING YOURSELF: INDEPENDENT
FEEDING YOURSELF: INDEPENDENT
LACK_OF_TRANSPORTATION: NO

## 2024-09-12 ASSESSMENT — ENCOUNTER SYMPTOMS
SHORTNESS OF BREATH: 0
FLANK PAIN: 1
CHILLS: 1
FEVER: 1
DIFFICULTY URINATING: 0
LIGHT-HEADEDNESS: 1
AGITATION: 0
CHEST TIGHTNESS: 0
FATIGUE: 1
ABDOMINAL PAIN: 1
VOMITING: 1
WHEEZING: 0
NAUSEA: 1
DIARRHEA: 0
DIAPHORESIS: 1
BACK PAIN: 1

## 2024-09-12 ASSESSMENT — PAIN - FUNCTIONAL ASSESSMENT
PAIN_FUNCTIONAL_ASSESSMENT: 0-10

## 2024-09-12 ASSESSMENT — LIFESTYLE VARIABLES
AUDIT-C TOTAL SCORE: 3
HOW OFTEN DO YOU HAVE A DRINK CONTAINING ALCOHOL: 2-3 TIMES A WEEK
AUDIT-C TOTAL SCORE: 3
HOW MANY STANDARD DRINKS CONTAINING ALCOHOL DO YOU HAVE ON A TYPICAL DAY: 1 OR 2
SKIP TO QUESTIONS 9-10: 1
HOW OFTEN DO YOU HAVE 6 OR MORE DRINKS ON ONE OCCASION: NEVER

## 2024-09-12 ASSESSMENT — PATIENT HEALTH QUESTIONNAIRE - PHQ9
SUM OF ALL RESPONSES TO PHQ9 QUESTIONS 1 & 2: 0
1. LITTLE INTEREST OR PLEASURE IN DOING THINGS: NOT AT ALL
2. FEELING DOWN, DEPRESSED OR HOPELESS: NOT AT ALL

## 2024-09-12 ASSESSMENT — PAIN SCALES - GENERAL
PAINLEVEL_OUTOF10: 0 - NO PAIN
PAINLEVEL_OUTOF10: 5 - MODERATE PAIN
PAINLEVEL_OUTOF10: 0 - NO PAIN

## 2024-09-12 ASSESSMENT — COLUMBIA-SUICIDE SEVERITY RATING SCALE - C-SSRS
2. HAVE YOU ACTUALLY HAD ANY THOUGHTS OF KILLING YOURSELF?: NO
1. IN THE PAST MONTH, HAVE YOU WISHED YOU WERE DEAD OR WISHED YOU COULD GO TO SLEEP AND NOT WAKE UP?: NO
6. HAVE YOU EVER DONE ANYTHING, STARTED TO DO ANYTHING, OR PREPARED TO DO ANYTHING TO END YOUR LIFE?: NO

## 2024-09-12 NOTE — PROGRESS NOTES
Care Coordinator Note:  TCC spoke with patient regarding dc planning. Demo is correct. Lives at home with wife independently. Denies DME, has had recent falls. Dx with Parkinsons Nov of 2023. PCP is Mariana Gomez- seen 1 week ago. Pharm is [facility] in [place]. Wife will be ride at DC.     Plan: patient in with ISABELLA, L obstructing Kidney stone.  IR placed L nephrostomy today. Salazar remains while RFP and urine output are monitored. In icu on pressors, iv atb and steroids contd.     Status: inpatient  Payor: Medicare  Disposition: Home with wife. ?New HHC RN if dc with Left neph tube  Barrier: pressors, urine output. Uro plans for ? Stent placement  ADOD: few days    Hilda Mejia [facility]      09/12/24 1154   Discharge Planning   Living Arrangements Spouse/significant other   Support Systems Spouse/significant other   Assistance Needed IND with ADL   Type of Residence Private residence   Number of Stairs to Enter Residence 0   Number of Stairs Within Residence 8   Do you have animals or pets at home? Yes   Type of Animals or Pets 2 cats   Who is requesting discharge planning? Provider   Home or Post Acute Services In home services   Type of Home Care Services Home nursing visits   Expected Discharge Disposition  Services   Does the patient need discharge transport arranged? Yes   RoundTrip coordination needed? Yes   Has discharge transport been arranged? No   Financial Resource Strain   How hard is it for you to pay for the very basics like food, housing, medical care, and heating? Not hard   Housing Stability   In the last 12 months, was there a time when you were not able to pay the mortgage or rent on time? N   At any time in the past 12 months, were you homeless or living in a shelter (including now)? N   Transportation Needs   In the past 12 months, has lack of transportation kept you from medical appointments or from getting medications? no   In the past 12 months, has lack of transportation kept you from  meetings, work, or from getting things needed for daily living? No   Patient Choice   Provider Choice list and CMS website (https://medicare.gov/care-compare#search) for post-acute Quality and Resource Measure Data were provided and reviewed with: Patient   Patient / Family choosing to utilize agency / facility established prior to hospitalization Yes

## 2024-09-12 NOTE — ASSESSMENT & PLAN NOTE
Neuro:    - Neuro checks per unit protocol  -Sleep/wake cycle hygiene   -Delirium precaution   -Pain control, PRN pain meds  -Antipyretic     CV:    Hx  HLD HTN   -Hold HTN medication   -Continue vasopressors, MAP goal >65mmhg    -x1 1L IV fluids   -Continuous cardiac monitoring and hourly vital signs??   -Electrolytes daily monitoring     PULM:    -supplemental oxygen to keep spo2 > 92%     GI/FEN/Endo:??    -NPO,    -Daily electrolyte as replete as needed??   -Santino Lactic     Renal/: Acute Renal failure, Left hydronephrosis   -per Urology: plan for OR today cystoscopy and stent placement  -Pre op orders in  -Salazar   -ATB: Zosyn  -Reflex cx with U/A    -Strict I&O   -Daily weights   -Renal Panel in am   -ICU electrolyte replacement protocol- keep potassium >4, magnesium >2. Trend daily BMP, mag, phos.   -Avoid Nephrotoxic Agents, NSAIDs/ Renal dose medications?     Heme/ID:???   -SCDs   -pDVT- Heparin SubQ?   -ATB:  Zosyn     MSK/Integumentary:   Reposition, Pressure relieving measures     Lines: IV, Salazar , possibly be needing a central line if levophed dose continues to escalate ???????????????????????????????????????????????????????????????????????????????????????????????????????????????????????????????????????????????????  Social/family/dispo: ICU    Code; Full.

## 2024-09-12 NOTE — PROGRESS NOTES
"Emergency Medicine Transition of Care Note.    I received Quinton Kennedy in signout from Dr. Mayer.  Please see the previous ED provider note for all HPI, PE and MDM up to the time of signout. This is in addition to the primary record.    In brief Quinton Kennedy is an 68 y.o. male presenting for   Chief Complaint   Patient presents with    Fever    Vomiting     At the time of signout we were awaiting: Admission    ED Course as of 09/12/24 0436   Thu Sep 12, 2024   0115 Pressure is now low, 80s/40s. [CG]   0152 ECG read and interpreted by me.  Sinus tachycardia, rate 134.  Leftward axis.  LAFB.  LVH.  No specific ST or T wave derangements. [CG]   0152 Labs are notable for CBC with leukopenia 2.5, mild anemia 12.5, normal platelets, CMP with elevated BUN 32 and creatinine 2.32, elevated lactate 4.0, normal lipase, elevated troponin 50, negative viral swabs, venous blood gas with normal pH 7.46, respiratory alkalosis with pCO2 31 [CG]   0153 CXR unremarkable [CG]   0202 CTAP shows obstructing 8 mm stone at the left UPJ with moderate to severe left hydronephrosis, concentric bladder wall thickening which may represent cystitis, nonobstructing stones in the right kidney with mild right hydronephrosis with no obstructing stones, nonspecific para-aortic lymph nodes. [CG]   0213 Urology Dr. Marques consulted. Will plan for stent in AM. Keep NPO.  [CG]   0231 UA consistent with UTI. Presentation consistent with urosepsis/possible septic shock.  Patient is currently getting pressure bagged 2 L of fluids after first liter.  He actually states he feels \"much better.\" If MAP still below 60 RN to start nor epi which was ordered.  Patient was handed off to Dr. Tom at the end of my shift pending reevaluation, repeat lactate, possible conversation with ICU if no improvement, and hospital admission. [CG]      ED Course User Index  [CG] Andreea Mayer MD         Diagnoses as of 09/12/24 0436   Kidney stone   ISABELLA (acute " kidney injury) (CMS-HCC)   Severe sepsis (Multi)       Medical Decision Making  I spoke to the ICU about the patient's continued hypotension after extensive fluids.  Total 4 L have been given.  I did order fifth liter this time of LR.  Patient remains hypotensive.  The patient has increasing on Levophed.    Final diagnoses:   [N20.0] Kidney stone   [N17.9] ISABELLA (acute kidney injury) (CMS-HCC)   [A41.9, R65.20] Severe sepsis (Multi)           Procedure  Procedures    Miki Tom MD

## 2024-09-12 NOTE — DISCHARGE INSTRUCTIONS
Nephrostomy Tube Care- Patient and Family Education    What is a Nephrostomy Tube?  You have a Nephrostomy Tube. This tube has been inserted into your kidney to drain urine. It is  important for urine to flow freely from your kidney through the tube. You may feel pressure in  your back over your kidney area if the urine cannot drain freely.    Sedation:  If you received medication for sedation, it will be active in your body for approximately 24  hours. So you may feel a little sleepy. Therefore, for the next 24 hours:  ? DO NOT drive a car, operate machinery or power tools. It is recommended that a   responsible adult be with you for the first 24 hours.  ? DO NOT drink any alcoholic beverages or take any non-prescriptive medications that   contain alcohol.  ? DO NOT make any important decisions or sign any legal/important documents.    To Prevent Problems:  You may need help in caring for your Nephrostomy Tube. Follow the steps listed below to  prevent problems.  ? Coil the tube so it is never bent or kinked. Tape the tube securely to your skin.  ? Empty the pouch when it is half full.  ? Change the dressing around the tube 3 times a week, or more often if it is soiled.  ? Do Not put any ointment or cream around the area unless your doctor tells you to do so.  ? Inspect the skin around the tube for any drainage, redness, or pus.  ? Drink at least 6 to 8, 8-ounce glasses of water, juice, or other liquid each day unless your  doctor restricts your fluid intake.  ? When you shower, cover your dressing with plastic. Tape the plastic to your skin.  ? Do not sit in a tub, or whirlpool. Do not go swimming.  ? The tube should be changed every 8 to 12 weeks by a radiologist. This will help prevent  infection. This should be scheduled in advance.    Call your Doctor If:  ? The tube falls out or pulls out partway.  ? Your urine has a strong odor.  ? Your urine is cloudy.  ? There is blood in your urine or you notice a change  in the amount of blood in your urine.  ? There is a sudden change in the amount of urine (check with your doctor about how much  to expect before you leave the hospital).  ? You have pain or pressure over the kidney area.  ? You see pus and redness on the skin where the tube is inserted.  ? There is urine leaking around the tube.    You may have some of these symptoms when you leave the hospital. Call your doctor if there is  a change.    Dressing Change:  ? To prevent or reduce infection, follow the steps below.  ? Gather supplies:  o Small waste bag.  o Paper tape.  o 4 x 4 sterile dressing.  o Clean washcloth and towel.  ? Wash your hands well. Dry them with a clean towel.  ? Cut 5 strips of tape, each about 6 inches long.  ? Open the sterile dressing package(s) by folding back the top. DO NOT TOUCH THE  DRESSING. Lay the package(s) on the table.  ? Remove the old dressing. Place it in the waste bag.  ? Inspect the skin around the tube for any redness, drainage, or pus. Check the old dressing  for dampness or odor. These may be signs that urine is leaking around the tube.  ? Carefully wash your skin with soap and water. Rinse and dry it with a clean towel.  ? Place the dressing around the tube one at a time. Change the direction of the slit dressing  so the entire area is covered and no skin shows.  ? Tape the dressing  ? Make sure there are no kinks in the tube, then coil it and tape to your skin.    Care of drainage Pouch:  ? Empty the pouch when it is half-full.  ? To lessen odor:  o Have 2 pouches. Change the drainage pouch when it is dirty and smells strong.  ? [place] the pouch to prevent pulling on the tube.  o Attach the belt and wear the pouch around your waist.  o If you choose not to use the belt, you can pin the pouch to your clothing.     How to Reach your Doctor:    Call [phone] with problems or questions.     This info is a general resource. It is not meant to replace your health care  provider’s advice.  Ask your doctor or health care team any questions. Always follow their instructions.

## 2024-09-12 NOTE — ED TRIAGE NOTES
PT BIBA from home with complaint of vomitting/nausea/fever and rapid heart rate. Per EMS, Pt HR was greater than 150. Pt was given 2  doses of adenosine that lowered his heart rate to 120s-130s. Pt is febrile on arrival and diaphoretic and shaking. Pt states he has a known kidney stone that he is being treated for. Pt was a sepsis alert during triage.

## 2024-09-12 NOTE — PROGRESS NOTES
Patient has been identified as having an emergent need for administration of iodinated contrast for CT scan prior to result of laboratory studies OR despite known elevated GFR due to possibility of life and/or limb threatening pathology.    I acknowledge the risks and benefits of emergently proceeding with contrast administration including that, at present, it is the position of the [employer] that contrast induced nephropathy (VESNA) is a rare but possible consequence. At this time the benefits of proceeding with contrast administration outweigh the risks.    Attempts will be made to mitigate possible VESNA risk with IV fluid hydration if able.

## 2024-09-12 NOTE — PROCEDURES
Central Line    Date/Time: 9/12/2024 2:42 PM    Performed by: RIN Hatfield  Authorized by: RIN Hatfield    Consent:     Consent obtained:  Verbal    Consent given by:  Patient and spouse    Risks, benefits, and alternatives were discussed: yes      Risks discussed:  Arterial puncture, pneumothorax, infection and bleeding  Universal protocol:     Patient identity confirmed:  Verbally with patient, hospital-assigned identification number and arm band  Pre-procedure details:     Indication(s): central venous access and hemodynamic monitoring      Hand hygiene: Hand hygiene performed prior to insertion      Sterile barrier technique: All elements of maximal sterile technique followed      Skin preparation:  Chlorhexidine    Skin preparation agent: Skin preparation agent completely dried prior to procedure    Sedation:     Sedation type:  None  Anesthesia:     Anesthesia method:  Local infiltration    Local anesthetic:  Lidocaine 1% w/o epi  Procedure details:     Location:  R internal jugular    Patient position:  Supine    Procedural supplies:  Triple lumen    Landmarks identified: yes      Ultrasound guidance: yes      Ultrasound guidance timing: prior to insertion and real time      Sterile ultrasound techniques: Sterile gel and sterile probe covers were used      Number of attempts:  1    Successful placement: yes    Post-procedure details:     Post-procedure:  Dressing applied and line sutured    Assessment:  Blood return through all ports, no pneumothorax on x-ray and placement verified by x-ray    Procedure completion:  Tolerated well, no immediate complications  Comments:      Procedure was performed separate from initial face to face encounter.    Time: 30 minutes

## 2024-09-12 NOTE — ED PROVIDER NOTES
"HPI     CC: Fever and Vomiting     HPI: Quinton Kennedy is a 68 y.o. male with a history of prostate CA s/p prostatectomy, kidney stones, elevated coronary artery calcium score, HTN, HLD, impaired fasting glucose, presents with abdominal pain, vomiting, and \"shivers.\"  Symptoms started around lunchtime when he started regurgitating some food and vomiting.  He had pain across his upper abdomen and into his bilateral flanks.  He has a history of kidney stones, follows with urologist at [facility].  His symptoms feel like his prior stones but worse.  Symptoms are bilateral and not worse on one side.  He denies chest pain, cough, shortness of breath, or abnormal bowel movements.  He did have some gross hematuria about 2.5 weeks ago which has resolved.  Denies dysuria or frequency.  Patient is notably febrile and tachycardic on presentation.  He reportedly received 6 of adenosine and route for \"tachycardia\" in the 150s but we do not have access of the rhythm strip.  Patient states he has been feeling nauseated and lightheaded since.    ROS: 10-point review of systems was performed and is otherwise negative except as noted in HPI.    Limitations to history: N/A    Independent Historians: Wife at bedside    External Records Reviewed: Outpatient notes in EMR    Past Medical History: Noncontributory except per HPI     Past Surgical History: Noncontributory except per HPI     Family History: Reviewed and noncontributory     Social History:  Denies tobacco. Denies ETOH. Denies illicit drugs.    Social Determinants Affecting Care: N/A    Allergies   Allergen Reactions    Compazine [Prochlorperazine] Unknown    Morphine Unknown     rash, nausea    Phenergan [Promethazine] Unknown    Reglan [Metoclopramide Hcl] Unknown       Home Meds: No current outpatient medications     Physical Exam     ED Triage Vitals [09/12/24 0014]   Temperature Heart Rate Respirations BP   37.8 °C (100.1 °F) (!) 126 (!) 46 132/59      Pulse Ox Temp src Heart Rate " "Source Patient Position   (!) 93 % -- -- --      BP Location FiO2 (%)     -- --         Heart Rate:  [102-126]   Temperature:  [37.8 °C (100.1 °F)]   Respirations:  [26-46]   BP: ()/(43-59)   Height:  [185 cm (6' 0.84\")]   Weight:  [90.7 kg (200 lb)]   Pulse Ox:  [90 %-100 %]      Physical Exam  Vitals and nursing note reviewed.     CONSTITUTIONAL: Well appearing, well nourished, in no acute distress.   HENMT: Head atraumatic. Airway patent. Nasal mucosa clear. Mouth with normal mucosa, clear oropharynx. Uvula midline. Neck supple.    EYES: Clear bilaterally, pupils equally round and reactive to light.   CARDIOVASCULAR: Tachycardic, regular rhythm.  Heart sounds S1, S2.  No murmurs, rubs or gallops. Normal pulses. Capillary refill < 2 sec.   RESPIRATORY: No increased work of breathing. Breath sounds clear and equal bilaterally.  GASTROINTESTINAL: Abdomen soft, non-distended, non-tender. No rebound, no guarding. Normal bowel sounds. No palpable masses.  GENITOURINARY:  No CVA tenderness.  MUSCULOSKELETAL: Spine appears normal, range of motion is not limited, no muscle or joint tenderness. No edema.   NEUROLOGICAL: Alert and oriented, no asymmetry, moving all extremities equally.  SKIN: Pale.  Warm, dry and intact. No rash or notable lesions.  PSYCHIATRIC: Normal mood and affect.  HEME/LYMPH: No adenopathy or splenomegaly.    Diagnostic Results      ECG: ECGs read and interpreted by me. See ED Course, below, for interpretation.    Labs Reviewed   CBC WITH AUTO DIFFERENTIAL - Abnormal       Result Value    WBC 2.5 (*)     nRBC 3.2 (*)     RBC 3.68 (*)     Hemoglobin 12.5 (*)     Hematocrit 36.4 (*)     MCV 99      MCH 34.0      MCHC 34.3      RDW 13.8      Platelets 184      Neutrophils % 81.4      Immature Granulocytes %, Automated 0.4      Lymphocytes % 5.7      Monocytes % 1.6      Eosinophils % 10.1      Basophils % 0.8      Neutrophils Absolute 2.01      Immature Granulocytes Absolute, Automated 0.01      " Lymphocytes Absolute 0.14 (*)     Monocytes Absolute 0.04 (*)     Eosinophils Absolute 0.25      Basophils Absolute 0.02     COMPREHENSIVE METABOLIC PANEL - Abnormal    Glucose 128 (*)     Sodium 137      Potassium 4.1      Chloride 103      Bicarbonate 21      Anion Gap 17      Urea Nitrogen 32 (*)     Creatinine 2.32 (*)     eGFR 30 (*)     Calcium 8.4 (*)     Albumin 3.4      Alkaline Phosphatase 77      Total Protein 5.6 (*)     AST 28      Bilirubin, Total 2.2 (*)     ALT 8 (*)    LACTATE - Abnormal    Lactate 4.0 (*)     Narrative:     Venipuncture immediately after or during the administration of Metamizole may lead to falsely low results. Testing should be performed immediately  prior to Metamizole dosing.   TROPONIN I, HIGH SENSITIVITY - Abnormal    Troponin I, High Sensitivity 50 (*)     Narrative:     Less than 99th percentile of normal range cutoff-  Female and children under 18 years old <14 ng/L; Male <21 ng/L: Negative  Repeat testing should be performed if clinically indicated.     Female and children under 18 years old 14-50 ng/L; Male 21-50 ng/L:  Consistent with possible cardiac damage and possible increased clinical   risk. Serial measurements may help to assess extent of myocardial damage.     >50 ng/L: Consistent with cardiac damage, increased clinical risk and  myocardial infarction. Serial measurements may help assess extent of   myocardial damage.      NOTE: Children less than 1 year old may have higher baseline troponin   levels and results should be interpreted in conjunction with the overall   clinical context.     NOTE: Troponin I testing is performed using a different   testing methodology at [facility] than at other   [facility]. Direct result comparisons should only   be made within the same method.   BLOOD GAS VENOUS FULL PANEL - Abnormal    POCT pH, Venous 7.46 (*)     POCT pCO2, Venous 31 (*)     POCT pO2, Venous 39      POCT SO2, Venous 68      POCT Oxy  Hemoglobin, Venous 65.5      POCT Hematocrit Calculated, Venous 39.0 (*)     POCT Sodium, Venous 135 (*)     POCT Potassium, Venous 4.2      POCT Chloride, Venous 102      POCT Ionized Calicum, Venous 1.15      POCT Glucose, Venous 136 (*)     POCT Lactate, Venous 4.3 (*)     POCT Base Excess, Venous -1.0      POCT HCO3 Calculated, Venous 22.0      POCT Hemoglobin, Venous 13.1 (*)     POCT Anion Gap, Venous 15.0      Patient Temperature 37.0      FiO2 30     URINALYSIS WITH REFLEX CULTURE AND MICROSCOPIC - Abnormal    Color, Urine Light-Orange (*)     Appearance, Urine Turbid (*)     Specific Gravity, Urine 1.024      pH, Urine 5.5      Protein, Urine 50 (1+) (*)     Glucose, Urine Normal      Blood, Urine 0.5 (2+) (*)     Ketones, Urine TRACE (*)     Bilirubin, Urine NEGATIVE      Urobilinogen, Urine Normal      Nitrite, Urine NEGATIVE      Leukocyte Esterase, Urine 500 Mina/µL (*)    MICROSCOPIC ONLY, URINE - Abnormal    WBC, Urine >50 (*)     RBC, Urine >20 (*)     Bacteria, Urine 4+ (*)     Mucus, Urine FEW     LIPASE - Normal    Lipase 27      Narrative:     Venipuncture immediately after or during the administration of Metamizole may lead to falsely low results. Testing should be performed immediately prior to Metamizole dosing.   SARS-COV-2 PCR - Normal    Coronavirus 2019, PCR Not Detected      Narrative:     This assay has received FDA Emergency Use Authorization (EUA) and is only authorized for the duration of time that circumstances exist to justify the authorization of the emergency use of in vitro diagnostic tests for the detection of SARS-CoV-2 virus and/or diagnosis of COVID-19 infection under section 564(b)(1) of the Act, 21 U.S.C. 360bbb-3(b)(1). This assay is an in vitro diagnostic nucleic acid amplification test for the qualitative detection of SARS-CoV-2 from nasopharyngeal specimens and has been validated for use at [facility]. Negative results do not preclude COVID-19  infections and should not be used as the sole basis for diagnosis, treatment, or other management decisions.     INFLUENZA A AND B PCR - Normal    Flu A Result Not Detected      Flu B Result Not Detected      Narrative:     This assay is an in vitro diagnostic multiplex nucleic acid amplification test for the detection and discrimination of Influenza A & B from nasopharyngeal specimens, and has been validated for use at [facility]. Negative results do not preclude Influenza A/B infections, and should not be used as the sole basis for diagnosis, treatment, or other management decisions. If Influenza A/B and RSV PCR results are negative, testing for Parainfluenza virus, Adenovirus and Metapneumovirus is routinely performed for [facility] pediatric oncology and intensive care inpatients, and is available on other patients by placing an add-on request.   B-TYPE NATRIURETIC PEPTIDE - Normal    BNP 98      Narrative:        <100 pg/mL - Heart failure unlikely  100-299 pg/mL - Intermediate probability of acute heart                  failure exacerbation. Correlate with clinical                  context and patient history.    >=300 pg/mL - Heart Failure likely. Correlate with clinical                  context and patient history.    BNP testing is performed using different testing methodology at [facility] than at [facility]. Direct result comparisons should only be made within the same method.      BLOOD CULTURE   BLOOD CULTURE   URINE CULTURE   URINALYSIS WITH REFLEX CULTURE AND MICROSCOPIC    Narrative:     The following orders were created for panel order Urinalysis with Reflex Culture and Microscopic.  Procedure                               Abnormality         Status                     ---------                               -----------         ------                     Urinalysis with Reflex C...[[id number]]  Abnormal            Final result               Extra Urine [place]  Tube[[id number]]                                                         Please view results for these tests on the individual orders.   BLOOD GAS LACTIC ACID, VENOUS   LACTATE         CT abdomen pelvis w IV contrast   Final Result   Obstructing 8 mm stone at the left UPJ with moderate to severe left   hydronephrosis.        Concentric bladder wall thickening which may represent cystitis.   Correlate with urinalysis.        Nonobstructing stones in the right kidney measure up to near 5 mm.   Mild right hydronephrosis with no obstructing ureteral stones.   Nonobstructing stones in the left kidney measure up to 5 mm.        Nonspecific para-aortic lymph nodes measuring up to 1.1 cm, increased   compared to prior imaging.        MACRO:   None.        Signed by: Evan Finkelstein 9/12/2024 1:58 AM   Dictation workstation:   [id number]      XR chest 2 views   Final Result   No radiographic evidence of acute cardiopulmonary pathology.        MACRO:   None.        Signed by: Evan Finkelstein 9/12/2024 1:50 AM   Dictation workstation:   [id number]                    No data recorded                Procedure  Critical Care    Performed by: Andreea Mayer MD  Authorized by: Andreea Mayer MD    Critical care provider statement:     Critical care time (minutes):  35    Critical care time was exclusive of:  Separately billable procedures and treating other patients and teaching time    Critical care was necessary to treat or prevent imminent or life-threatening deterioration of the following conditions:  Circulatory failure, shock and sepsis    Critical care was time spent personally by me on the following activities:  Development of treatment plan with patient or surrogate, evaluation of patient's response to treatment, examination of patient, obtaining history from patient or surrogate, ordering and performing treatments and interventions, ordering and review of laboratory studies, ordering and review of  "radiographic studies, pulse oximetry, re-evaluation of patient's condition and review of old charts      ED Course & MDM   Assessment/Plan:   Quinton Kennedy is a 68 y.o. male with a history of prostate CA s/p prostatectomy, kidney stones, elevated coronary artery calcium score, HTN, HLD, impaired fasting glucose, presents with abdominal pain, vomiting, and \"shivers.\"  Symptoms are similar to prior kidney stones but more severe.  He is febrile and tachycardic on presentation prompting a sepsis alert and empiric vancomycin and Zosyn to be started.  He is initially normotensive.  Differential is broad, includes urosepsis from pyelonephritis or kidney stone, or other occult intra-abdominal pathology.  He was mildly hypoxic on presentation, perhaps related to fever and tachycardia, patient is denying shortness of breath or cough at this time.  Workup was initiated with ECG, labs, chest x-ray, urinalysis, and CTAP.  Treatment was initiated with a liter bolus, vancomycin and Zosyn in addition to Tylenol for his fever. See below for details of ED course and ultimate disposition.    Medications   vancomycin (Vancocin) 2000 mg/500 ml in D5W 500 mL IV piggyback 2 g (2 g intravenous New Bag 9/12/24 0115)   sodium chloride 0.9 % bolus 1,000 mL (1,000 mL intravenous New Bag 9/12/24 0219)   norepinephrine (Levophed) 8 mg in dextrose 5% 250 mL (0.032 mg/mL) infusion (premix) (has no administration in time range)   sodium chloride 0.9 % bolus 1,000 mL (0 mL intravenous Stopped 9/12/24 0218)   piperacillin-tazobactam (Zosyn) 4.5 g in dextrose (iso)  mL (0 g intravenous Stopped 9/12/24 0117)   acetaminophen (Tylenol) tablet 650 mg (650 mg oral Given 9/12/24 0035)   sodium chloride 0.9 % bolus 1,000 mL (0 mL intravenous Stopped 9/12/24 0214)   iohexol (OMNIPaque) 350 mg iodine/mL solution 75 mL (75 mL intravenous Given 9/12/24 0128)        ED Course as of 09/12/24 0241   u Sep 12, 2024   0115 Pressure is now low, 80s/40s. [CG] " "  0152 ECG read and interpreted by me.  Sinus tachycardia, rate 134.  Leftward axis.  LAFB.  LVH.  No specific ST or T wave derangements. [CG]   0152 Labs are notable for CBC with leukopenia 2.5, mild anemia 12.5, normal platelets, CMP with elevated BUN 32 and creatinine 2.32, elevated lactate 4.0, normal lipase, elevated troponin 50, negative viral swabs, venous blood gas with normal pH 7.46, respiratory alkalosis with pCO2 31 [CG]   0153 CXR unremarkable [CG]   0202 CTAP shows obstructing 8 mm stone at the left UPJ with moderate to severe left hydronephrosis, concentric bladder wall thickening which may represent cystitis, nonobstructing stones in the right kidney with mild right hydronephrosis with no obstructing stones, nonspecific para-aortic lymph nodes. [CG]   0213 Urology Dr. Marques consulted. Will plan for stent in AM. Keep NPO.  [CG]   0231 UA consistent with UTI. Presentation consistent with urosepsis/possible septic shock.  Patient is currently getting pressure bagged 2 L of fluids after first liter.  He actually states he feels \"much better.\" If MAP still below 60 RN to start nor epi which was ordered.  Patient was handed off to Dr. Tom at the end of my shift pending reevaluation, repeat lactate, possible conversation with ICU if no improvement, and hospital admission. [CG]      ED Course User Index  [CG] Andreea Mayer MD         Diagnoses as of 09/12/24 0241   Kidney stone   ISABELLA (acute kidney injury) (CMS-[facility])   Severe sepsis (Multi)       Disposition:   Handoff - Dr. Tom. Details of clinical presentation, medical decision-making, pending evaluation and disposition were discussed with oncoming physician. Please see their transition of care note for details of further ED course.     ED Prescriptions    None         Andreea Mayer MD  EM/IM/Peds    This note was dictated by speech recognition. Minor errors in transcription may be present.     Andreea Mayer MD  09/12/24 " [id number]

## 2024-09-12 NOTE — INTERVAL H&P NOTE
H&P reviewed. The patient was examined and there are no changes to the H&P. Urosepsis, obstructing renal stone. For nephrostomy tube placement today.

## 2024-09-12 NOTE — PROGRESS NOTES
09/12/24 1154   [facility] Disability Status   Are you deaf or do you have serious difficulty hearing? N   Are you blind or do you have serious difficulty seeing, even when wearing glasses? N   Because of a physical, mental, or emotional condition, do you have serious difficulty concentrating, remembering, or making decisions? (5 years old or older) N   Do you have serious difficulty walking or climbing stairs? N   Do you have serious difficulty dressing or bathing? N   Because of a physical, mental, or emotional condition, do you have serious difficulty doing errands alone such as visiting the doctor? N

## 2024-09-12 NOTE — Clinical Note
Patient Clipped and Prepped: left flank. Prepped with ChloraPrep, a minimum of 3 minute dry time, longer if needed, no pooling noted, patient draped in sterile fashion.

## 2024-09-12 NOTE — PROCEDURES
The area was prepped with chlorhexidine and draped in the usual sterile fashion. Anesthesia was obtained with Lidocaine 1%.  Artery was visualized utilizing ultrasound guidance and the artery was cannulated with a 20 G arterial catheter under ultrasound. Catheter was exchanged over a guidewire and bright red pulsatile blood exited catheter.  Appropriate wave form was noted and the patient tolerated the procedure well.

## 2024-09-12 NOTE — POST-PROCEDURE NOTE
Interventional Radiology Brief Postprocedure Note    Attending: Fredis Sánchez MD     Assistant: none    Diagnosis: obstructing left UPJ stone and urosepsis    Description of procedure: Successful placement of a 10F left nephrostomy tube connected to gravity drainage bag.     Anesthesia:  Local and fentanyl    Complications: None    Estimated Blood Loss: minimal    Medications  As of 09/12/24 1051      sodium chloride 0.9 % bolus 1,000 mL (mL/hr) Total volume:  Not documented* Dosing weight:  90.7   *Total volume has not been documented. View each administration to see the amount administered.     Date/Time Rate/Dose/Volume Action       09/12/24  0035 1,000 mL - 500 mL/hr (over 120 min) New Bag      0122 1,000 mL - 2,000 mL/hr (over 30 min) New Bag      0214 1,000 mL Stopped      0218  (over 120 min) Stopped      0219 1,000 mL - 2,000 mL/hr (over 30 min) New Bag      0327 1,000 mL Stopped               sodium chloride 0.9 % bolus 1,000 mL (mL/hr) Total volume:  1,000 mL* Dosing weight:  90.7   *From user-documented volume     Date/Time Rate/Dose/Volume Action       09/12/24  0324 1,000 mL - 2,000 mL/hr (over 30 min) New Bag      0327 1,000 mL Stopped               piperacillin-tazobactam (Zosyn) 4.5 g in dextrose (iso)  mL (g) Total dose:  4.5 g* Dosing weight:  90.7   *From user-documented volume     Date/Time Rate/Dose/Volume Action       09/12/24  0035 4.5 g (over 30 min) New Bag      0117 100 mL Stopped               vancomycin (Vancocin) 2000 mg/500 ml in D5W 500 mL IV piggyback 2 g (g) Total dose:  2 g* Dosing weight:  90.7   *From user-documented volume     Date/Time Rate/Dose/Volume Action       09/12/24  0115 2 g (over 120 min) New Bag      0401 500 mL Stopped               acetaminophen (Tylenol) tablet 650 mg (mg) Total dose:  650 mg Dosing weight:  90.7      Date/Time Rate/Dose/Volume Action       09/12/24  0035 650 mg Given               iohexol (OMNIPaque) 350 mg iodine/mL solution 75 mL (mL)  Total volume:  75 mL Dosing weight:  90.7      Date/Time Rate/Dose/Volume Action       09/12/24  0128 75 mL Given               iohexol (OMNIPaque) 350 mg iodine/mL solution (mL) Total volume:  10 mL      Date/Time Rate/Dose/Volume Action       09/12/24  1047 10 mL Given               norepinephrine (Levophed) 8 mg in dextrose 5% 250 mL (0.032 mg/mL) infusion (premix) (mcg/kg/min) Total dose:  Cannot be calculated* Dosing weight:  90.7   *Total user-documented volume 65.76 mL may contain volume from other administrations     Date/Time Rate/Dose/Volume Action       09/12/24  0304 0.01 mcg/kg/min - 1.7 mL/hr New Bag      0325 0.03 mcg/kg/min - 5.1 mL/hr Rate Change      0332 0.02 mcg/kg/min - 3.4 mL/hr Rate Change      0340 0.03 mcg/kg/min - 5.1 mL/hr Rate Change      0400 0.04 mcg/kg/min - 6.8 mL/hr Rate Change      0406 0.06 mcg/kg/min - 10.2 mL/hr Rate Change      0410 0.07 mcg/kg/min - 11.9 mL/hr Rate Change      0430 0.09 mcg/kg/min - 15.31 mL/hr Rate Change      0431 0.1 mcg/kg/min - 17.01 mL/hr Rate Change      0440 0.11 mcg/kg/min - 18.71 mL/hr Rate Change      0444 0.12 mcg/kg/min - 20.4 mL/hr Rate Change      0449 0.13 mcg/kg/min - 22.1 mL/hr Rate Change      0551 0.14 mcg/kg/min - 23.8 mL/hr Rate Change      0615 0.14 mcg/kg/min - 23.8 mL/hr Rate Verify      0620 0.15 mcg/kg/min - 25.5 mL/hr Rate Change      0700 0.15 mcg/kg/min - 25.5 mL/hr Rate Verify      0710  Stopped      0717 0.17 mcg/kg/min - 28.9 mL/hr Rate Change               norepinephrine (Levophed) 8 mg in dextrose 5% 250 mL (0.032 mg/mL) infusion (premix) (mcg/kg/min) Total dose:  Cannot be calculated* Dosing weight:  90.7   *Total user-documented volume 65.76 mL may contain volume from other administrations     Date/Time Rate/Dose/Volume Action       09/12/24  0721 0.2 mcg/kg/min - 34 mL/hr Rate Change      0934 0.25 mcg/kg/min - 42.5 mL/hr New Bag               lactated Ringer's bolus 1,000 mL (mL/hr) Total volume:  1,000 mL* Dosing weight:   90.7   *From user-documented volume     Date/Time Rate/Dose/Volume Action       09/12/24  0324 1,000 mL - 999 mL/hr (over 60 min) New Bag      0443 1,000 mL Stopped               lactated Ringer's bolus 500 mL (mL/hr) Total volume:  0 mL* Dosing weight:  98.2   *From user-documented volume     Date/Time Rate/Dose/Volume Action       09/12/24  0542 500 mL - 250 mL/hr (over 120 min) [dose] - 0 mL [vol] New Bag      0822  (over 120 min) Stopped               heparin (porcine) injection 5,000 Units (Units) Total dose:  0 Units* Dosing weight:  90.7   *Administration not included in total     Date/Time Rate/Dose/Volume Action       09/12/24  0822 *5,000 Units Missed               piperacillin-tazobactam (Zosyn) 3.375 g in dextrose (iso) IV 50 mL (g) Total dose:  3.375 g Dosing weight:  98.2      Date/Time Rate/Dose/Volume Action       09/12/24  0836 3.375 g (over 30 min) New Bag      0934  (over 30 min) Stopped               lidocaine 2 % mucosal jelly (Uro-Jet) 1 Application (Application) Total dose:  1 Application Dosing weight:  90.7      Date/Time Rate/Dose/Volume Action       09/12/24  0401 1 Application Given               vasopressin (Vasostrict) 0.2 unit/mL in 5% dextrose 100 mL IV (Units/min) Total dose:  1.86 Units* Dosing weight:  98.2   *From user-documented volume     Date/Time Rate/Dose/Volume Action       09/12/24  0558 0.03 Units/min - 9 mL/hr New Bag      0615 0.03 Units/min - 9 mL/hr Rate Verify      0700 0.03 Units/min - 9 mL/hr Rate Verify      0934 0.03 Units/min - 9 mL/hr New Bag               fentaNYL PF (Sublimaze) injection 25 mcg (mcg) Total dose:  25 mcg Dosing weight:  98.2      Date/Time Rate/Dose/Volume Action       09/12/24 0823 25 mcg Given               fentaNYL PF (Sublimaze) injection  - Omnicell Override Pull (mcg) Total dose:  25 mcg      Date/Time Rate/Dose/Volume Action       09/12/24 0823 25 mcg Given               fentaNYL PF (Sublimaze) injection (mcg) Total dose:  50 mcg       Date/Time Rate/Dose/Volume Action       09/12/24  1027 50 mcg Given               hydrocortisone sod succ (PF) (Solu-CORTEF) injection 50 mg (mg) Total dose:  50 mg Dosing weight:  98.2      Date/Time Rate/Dose/Volume Action       09/12/24  0836 50 mg Given               carbidopa-levodopa (Sinemet)  mg per tablet 1.5 tablet (tablet) Total dose:  1.5 tablet Dosing weight:  98.2      Date/Time Rate/Dose/Volume Action       09/12/24  0852 1.5 tablet Given               lidocaine (Xylocaine) 20 mg/mL (2 %) injection (mL) Total volume:  10 mL      Date/Time Rate/Dose/Volume Action       09/12/24  1022 10 mL Given               oxygen (O2) therapy (L/min) Total volume:  Not documented*   *Total volume has not been documented. View each administration to see the amount administered.     Date/Time Rate/Dose/Volume Action       09/12/24  1023 4 L/min - 240,000 mL/hr Rate Change                   No specimens collected      See detailed result report with images in PACS.    The patient tolerated the procedure well without incident or complication and is in stable condition.

## 2024-09-12 NOTE — ASSESSMENT & PLAN NOTE
Neuro:    -Q1 neuro check per ICU protocol   -CAM ICU score q-shift   -Sleep/wake cycle hygiene   -Delirium precaution   -Pain control, PRN pain meds  -Antipyretic     CV:    Hx  HLD HTN   -Hold HTN medication   -Continue vasopressors, MAP goal >65mmhg    -x1 1L IV fluids   -Continuous cardiac monitoring and hourly vital signs??   -Electrolytes daily monitoring     PULM:    -supplemental oxygen to keep spo2 > 92%     GI/FEN/Endo:??    -NPO,    -Daily electrolyte as replete as needed??   -Santino Lactic     Renal/: Acute Renal failure, Left hydronephrosis   -per Urology: plan for OR today cystoscopy and stent placement  -Pre op orders in  -[facility]   -ATB: Zosyn  -Reflex cx with U/A    -Strict I&O   -Daily weights   -Renal Panel in am   -ICU electrolyte replacement protocol- keep potassium >4, magnesium >2. Trend daily BMP, mag, phos.   -Avoid Nephrotoxic Agents, NSAIDs/ Renal dose medications?     Heme/ID:???   -SCDs   -pDVT- Heparin SubQ?   -ATB:  Zosyn     MSK/Integumentary:   Reposition, Pressure relieving measures     Lines: IV, Salazar , possibly be needing a central line if levophed dose continues to escalate ???????????????????????????????????????????????????????????????????????????????????????????????????????????????????????????????????????????????????  Social/family/dispo: ICU    Code; Full.

## 2024-09-12 NOTE — CONSULTS
"Reason For Consult  Obstructive left 8 mm UPJ stone    History Of Present Illness  Quinton Kennedy is a 68 y.o. male  with a history of prostate CA s/p prostatectomy, kidney stones, elevated coronary artery calcium score, HTN, HLD, impaired fasting glucose, who presented to the ED with abdominal pain, vomiting, and \"shivers.\"  Symptoms started around lunchtime 9/11/24 when he started regurgitating some food and vomiting.  He had pain across his upper abdomen and into his bilateral flanks.  He has a history of kidney stones, follows with urologist at [facility].  His symptoms feel like his prior stones but worse.  Symptoms are bilateral and not worse on one side.  He denies chest pain, cough, shortness of breath, or abnormal bowel movements.  He did have some gross hematuria about 2.5 weeks ago which has resolved.  Denies dysuria or frequency.  Patient presented febrile and tachycardic  He reportedly received 6 of adenosine and route for \"tachycardia\".  Patient states he has been feeling nauseated and lightheaded since.     Labs reviewed noting elevated Cr  2.32, Lactate 4.6, Troponin 50,     Imaging reviewed noting 8 mm left UPJ stone with moderate to severe hydronephrosis     Past Medical History  He has a past medical history of Coronary artery disease, Kidney stones, and Parkinson's disease (Multi).    Surgical History  He has a past surgical history that includes Other surgical history (07/26/2013) and Shoulder surgery (07/26/2013).     Social History  He has no history on file for tobacco use, alcohol use, and drug use.    Family History  No family history on file.     Allergies  Compazine [prochlorperazine], Morphine, Phenergan [promethazine], and Reglan [metoclopramide hcl]    Review of Systems  Review of Systems   All other systems reviewed and are negative.        Physical Exam  CONSTITUTIONAL: Well appearing, well nourished, in no acute distress.   HENMT: Head atraumatic. Airway patent. Nasal mucosa clear. Mouth " "with normal mucosa, clear oropharynx. Uvula midline. Neck supple.    EYES: Clear bilaterally, pupils equally round and reactive to light.   CARDIOVASCULAR: Tachycardic, regular rhythm.  Heart sounds S1, S2.  No murmurs, rubs or gallops. Normal pulses. Capillary refill < 2 sec.   RESPIRATORY: No increased work of breathing. Breath sounds clear and equal bilaterally.  GASTROINTESTINAL: Abdomen soft, non-distended, non-tender. No rebound, no guarding. Normal bowel sounds. No palpable masses.  GENITOURINARY:  L CVA tenderness.  MUSCULOSKELETAL: Spine appears normal, range of motion is not limited, no muscle or joint tenderness. No edema.   NEUROLOGICAL: Alert and oriented, no asymmetry, moving all extremities equally.  SKIN: Pale.  Warm, dry and intact. No rash or notable lesions.  PSYCHIATRIC: Normal mood and affect.  HEME/LYMPH: No adenopathy or splenomegaly.     Last Recorded Vitals  Blood pressure (!) 75/43, pulse (!) 102, temperature 37.8 °C (100.1 °F), resp. rate (!) 26, height 1.85 m (6' 0.84\"), weight 90.7 kg (200 lb), SpO2 96%.    Relevant Results  Recent Results (from the past 12 hour(s))   CBC and Auto Differential    Collection Time: 09/12/24 12:28 AM   Result Value Ref Range    WBC 2.5 (L) 4.4 - 11.3 x10*3/uL    nRBC 3.2 (H) 0.0 - 0.0 /100 WBCs    RBC 3.68 (L) 4.50 - 5.90 x10*6/uL    Hemoglobin 12.5 (L) 13.5 - 17.5 g/dL    Hematocrit 36.4 (L) 41.0 - 52.0 %    MCV 99 80 - 100 fL    MCH 34.0 26.0 - 34.0 pg    MCHC 34.3 32.0 - 36.0 g/dL    RDW 13.8 11.5 - 14.5 %    Platelets 184 150 - 450 x10*3/uL    Neutrophils % 81.4 40.0 - 80.0 %    Immature Granulocytes %, Automated 0.4 0.0 - 0.9 %    Lymphocytes % 5.7 13.0 - 44.0 %    Monocytes % 1.6 2.0 - 10.0 %    Eosinophils % 10.1 0.0 - 6.0 %    Basophils % 0.8 0.0 - 2.0 %    Neutrophils Absolute 2.01 1.20 - 7.70 x10*3/uL    Immature Granulocytes Absolute, Automated 0.01 0.00 - 0.70 x10*3/uL    Lymphocytes Absolute 0.14 (L) 1.20 - 4.80 x10*3/uL    Monocytes Absolute " 0.04 (L) 0.10 - 1.00 x10*3/uL    Eosinophils Absolute 0.25 0.00 - 0.70 x10*3/uL    Basophils Absolute 0.02 0.00 - 0.10 x10*3/uL   Comprehensive Metabolic Panel    Collection Time: 09/12/24 12:28 AM   Result Value Ref Range    Glucose 128 (H) 74 - 99 mg/dL    Sodium 137 136 - 145 mmol/L    Potassium 4.1 3.5 - 5.3 mmol/L    Chloride 103 98 - 107 mmol/L    Bicarbonate 21 21 - 32 mmol/L    Anion Gap 17 10 - 20 mmol/L    Urea Nitrogen 32 (H) 6 - 23 mg/dL    Creatinine 2.32 (H) 0.50 - 1.30 mg/dL    eGFR 30 (L) >60 mL/min/1.73m*2    Calcium 8.4 (L) 8.6 - 10.3 mg/dL    Albumin 3.4 3.4 - 5.0 g/dL    Alkaline Phosphatase 77 33 - 136 U/L    Total Protein 5.6 (L) 6.4 - 8.2 g/dL    AST 28 9 - 39 U/L    Bilirubin, Total 2.2 (H) 0.0 - 1.2 mg/dL    ALT 8 (L) 10 - 52 U/L   Lactate    Collection Time: 09/12/24 12:28 AM   Result Value Ref Range    Lactate 4.0 (HH) 0.4 - 2.0 mmol/L   Lipase    Collection Time: 09/12/24 12:28 AM   Result Value Ref Range    Lipase 27 9 - 82 U/L   Troponin I, High Sensitivity    Collection Time: 09/12/24 12:28 AM   Result Value Ref Range    Troponin I, High Sensitivity 50 (H) 0 - 20 ng/L   Blood Gas Venous Full Panel    Collection Time: 09/12/24 12:28 AM   Result Value Ref Range    POCT pH, Venous 7.46 (H) 7.33 - 7.43 pH    POCT pCO2, Venous 31 (L) 41 - 51 mm Hg    POCT pO2, Venous 39 35 - 45 mm Hg    POCT SO2, Venous 68 45 - 75 %    POCT Oxy Hemoglobin, Venous 65.5 45.0 - 75.0 %    POCT Hematocrit Calculated, Venous 39.0 (L) 41.0 - 52.0 %    POCT Sodium, Venous 135 (L) 136 - 145 mmol/L    POCT Potassium, Venous 4.2 3.5 - 5.3 mmol/L    POCT Chloride, Venous 102 98 - 107 mmol/L    POCT Ionized Calicum, Venous 1.15 1.10 - 1.33 mmol/L    POCT Glucose, Venous 136 (H) 74 - 99 mg/dL    POCT Lactate, Venous 4.3 (HH) 0.4 - 2.0 mmol/L    POCT Base Excess, Venous -1.0 -2.0 - 3.0 mmol/L    POCT HCO3 Calculated, Venous 22.0 22.0 - 26.0 mmol/L    POCT Hemoglobin, Venous 13.1 (L) 13.5 - 17.5 g/dL    POCT Anion Gap,  Venous 15.0 10.0 - 25.0 mmol/L    Patient Temperature 37.0 degrees Celsius    FiO2 30 %   B-type Natriuretic Peptide    Collection Time: 09/12/24 12:28 AM   Result Value Ref Range    BNP 98 0 - 99 pg/mL   Sars-CoV-2 PCR    Collection Time: 09/12/24 12:31 AM   Result Value Ref Range    Coronavirus 2019, PCR Not Detected Not Detected   Influenza A, and B PCR    Collection Time: 09/12/24 12:31 AM   Result Value Ref Range    Flu A Result Not Detected Not Detected    Flu B Result Not Detected Not Detected   Urinalysis with Reflex Culture and Microscopic    Collection Time: 09/12/24  2:11 AM   Result Value Ref Range    Color, Urine Light-Orange (N) Light-Yellow, Yellow, Dark-Yellow    Appearance, Urine Turbid (N) Clear    Specific Gravity, Urine 1.024 1.005 - 1.035    pH, Urine 5.5 5.0, 5.5, 6.0, 6.5, 7.0, 7.5, 8.0    Protein, Urine 50 (1+) (A) NEGATIVE, 10 (TRACE), 20 (TRACE) mg/dL    Glucose, Urine Normal Normal mg/dL    Blood, Urine 0.5 (2+) (A) NEGATIVE    Ketones, Urine TRACE (A) NEGATIVE mg/dL    Bilirubin, Urine NEGATIVE NEGATIVE    Urobilinogen, Urine Normal Normal mg/dL    Nitrite, Urine NEGATIVE NEGATIVE    Leukocyte Esterase, Urine 500 Mina/µL (A) NEGATIVE   Microscopic Only, Urine    Collection Time: 09/12/24  2:11 AM   Result Value Ref Range    WBC, Urine >50 (A) 1-5, NONE /HPF    RBC, Urine >20 (A) NONE, 1-2, 3-5 /HPF    Bacteria, Urine 4+ (A) NONE SEEN /HPF    Mucus, Urine FEW Reference range not established. /LPF   Lactate    Collection Time: 09/12/24  2:18 AM   Result Value Ref Range    Lactate          CT abdomen pelvis w IV contrast    Result Date: 9/12/2024  Obstructing 8 mm stone at the left UPJ with moderate to severe left hydronephrosis.   Concentric bladder wall thickening which may represent cystitis. Correlate with urinalysis.   Nonobstructing stones in the right kidney measure up to near 5 mm. Mild right hydronephrosis with no obstructing ureteral stones. Nonobstructing stones in the left kidney  measure up to 5 mm.   Nonspecific para-aortic lymph nodes measuring up to 1.1 cm, increased compared to prior imaging.   MACRO: None.   Signed by: Evan Finkelstein 9/12/2024 1:58 AM Dictation workstation:   [id number]      Assessment/Plan     68-year-old presenting with left UPJ 8 mm obstructive stone, hypotension with concerns for sepsis.    #1 patient to be admitted to Medicine service for stabilization and IV antibiotics.  Sepsis protocol initiated per ED.  #2 n.p.o.  #3  Patient will be scheduled for an urgent cystoscopy with left ureteral stent placement and fluoroscopy today 9/12/2024.     LEONARD Marques MD    I spent 30 minutes in the professional and overall care of this patient.      Oscar Marques MD

## 2024-09-12 NOTE — RESEARCH NOTES
Artificial Intelligence Monitoring in Nursing (AIMS Nursing) Study    Principle Investigator - Dr. Ariel Deluna  Research Coordinator - Roland Hogan RN     Patient Name - Quinton Kennedy  Date - 9/12/2024 9:55 AM  Location - 4th Floor ICU, [facility]    Quinton Kennedy was approached by Roland Hogan RN to talk about participating in the AIMS Nursing Study. The patient was not able to be approached, a research coordinator will come back at a later time. Study protocol was followed and patient was given study contact information.     Roland Hogan RN

## 2024-09-12 NOTE — CONSULTS
Consults    Reason For Consult  Nephrostomy tube placement    History Of Present Illness  Quinton Kennedy is a 68 y.o. male presenting with urosepsis. He has a history of BPH, prostate carcinoma s/p prostatectomy, urethral stenting, and kidney stones. He noted worsening nausea, vomiting, and fever, with shivering and was brought to the hospital by EMS. He had remote hematuria and passing of stones in the past 2 weeks. He complains of abdominal pain and malaise. CT abdomen pelvis  was completed in the ED, which demonstrated an 8mm obstructive stone in the left UPJ, with moderate hydronephrosis. Urology consulted for urgent cystoscopy. Patient with interval hypotension requiring vasopressor therapy. IR consulted for placement of a left nephrostomy tube.      Past Medical History  He has a past medical history of Coronary artery disease, Kidney stones, and Parkinson's disease (Multi).    Surgical History  He has a past surgical history that includes Other surgical history (07/26/2013) and Shoulder surgery (07/26/2013).     Social History  He reports that he has never smoked. He has never used smokeless tobacco. No history on file for alcohol use and drug use.    Family History  No family history on file.     Allergies  Compazine [prochlorperazine], Morphine, Phenergan [promethazine], and Reglan [metoclopramide hcl]    MEDS:    Current Facility-Administered Medications:     acetaminophen (Tylenol) tablet 650 mg, 650 mg, oral, q4h PRN, Bretzyl M Liscoe, APRN-CNP    carbidopa-levodopa (Sinemet)  mg per tablet 1.5 tablet, 1.5 tablet, oral, q8h, Melonie Barrera, APRN-CNP, 1.5 tablet at 09/12/24 0852    heparin (porcine) injection 5,000 Units, 5,000 Units, subcutaneous, q8h, Franklinyl M Liscoe, APRN-CNP    hydrocortisone sod succ (PF) (Solu-CORTEF) injection 50 mg, 50 mg, intravenous, q6h, Bart Lemus, APRN-CNP, 50 mg at 09/12/24 0836    norepinephrine (Levophed) 8 mg in dextrose 5% 250 mL (0.032 mg/mL) infusion  (premix), 0-0.5 mcg/kg/min, intravenous, Continuous, Bretzyl M Liscoe, APRN-CNP, Last Rate: 34 mL/hr at 09/12/24 0721, 0.2 mcg/kg/min at 09/12/24 0721    oxygen (O2) therapy, , inhalation, Continuous PRN - O2/gases, Bretzyl M Liscoe, APRN-CNP    piperacillin-tazobactam (Zosyn) 3.375 g in dextrose (iso) IV 50 mL, 3.375 g, intravenous, q6h, Bretzyl M Liscoe, APRN-CNP, 3.375 g at 09/12/24 0836    vasopressin (Vasostrict) 0.2 unit/mL in 5% dextrose 100 mL IV, 0-0.03 Units/min, intravenous, Continuous, Bretzyl M Liscoe, APRN-CNP, Last Rate: 9 mL/hr at 09/12/24 0700, 0.03 Units/min at 09/12/24 0700    Review of Systems  History obtained from chart review and the patient  General ROS: positive for  - chills, fatigue, and malaise  Respiratory ROS: no cough, shortness of breath, or wheezing  Cardiovascular ROS: no chest pain or dyspnea on exertion  Gastrointestinal ROS: no abdominal pain, change in bowel habits, or black or bloody stools  Genitourinary ROS: + flank pain, hematuria     Last Recorded Vitals  /58   Pulse 99   Temp 36.6 °C (97.8 °F) (Temporal)   Resp (!) 28   Wt 98.2 kg (216 lb 7.9 oz)   SpO2 96%      Physical Exam  Orientation: oriented to person, place, time, and general circumstances  HEENT: normocephalic, atraumatic  Pulm: normal  Cardiac: Regular rate and rhythm  GI:  Mild TTP left flank  Pulses: peripheral pulses symmetrical    Relevant Results    LABS:  Lab Results   Component Value Date    WBC 2.5 (L) 09/12/2024    HGB 12.5 (L) 09/12/2024    HCT 36.4 (L) 09/12/2024    MCV 99 09/12/2024     09/12/2024      Results from last 72 hours   Lab Units 09/12/24  0028   SODIUM mmol/L 137   POTASSIUM mmol/L 4.1   CHLORIDE mmol/L 103   CO2 mmol/L 21   BUN mg/dL 32*   CREATININE mg/dL 2.32*   GLUCOSE mg/dL 128*   CALCIUM mg/dL 8.4*   ANION GAP mmol/L 17   EGFR mL/min/1.73m*2 30*     Results from last 72 hours   Lab Units 09/12/24  0028   ALK PHOS U/L 77   BILIRUBIN TOTAL mg/dL 2.2*   PROTEIN TOTAL  "g/dL 5.6*   ALT U/L 8*   AST U/L 28   ALBUMIN g/dL 3.4           No lab exists for component: \"PT\"    MICRO:  No results found for the last 14 days.      IMAGING:   XR chest 1 view   Final Result   Status post right jugular line placement as above. No pneumothorax.   New bilateral infiltrates.        MACRO:   None.        Signed by: Ginny Clemons 9/12/2024 8:24 AM   Dictation workstation:   [id number]      CT abdomen pelvis w IV contrast   Final Result   Obstructing 8 mm stone at the left UPJ with moderate to severe left   hydronephrosis.        Concentric bladder wall thickening which may represent cystitis.   Correlate with urinalysis.        Nonobstructing stones in the right kidney measure up to near 5 mm.   Mild right hydronephrosis with no obstructing ureteral stones.   Nonobstructing stones in the left kidney measure up to 5 mm.        Nonspecific para-aortic lymph nodes measuring up to 1.1 cm, increased   compared to prior imaging.        MACRO:   None.        Signed by: Evan Finkelstein 9/12/2024 1:58 AM   Dictation workstation:   [id number]      XR chest 2 views   Final Result   No radiographic evidence of acute cardiopulmonary pathology.        MACRO:   None.        Signed by: Evan Finkelstein 9/12/2024 1:50 AM   Dictation workstation:   [id number]      IR placement of nephrostomy catheter    (Results Pending)   US guided percutaneous placement    (Results Pending)        Assessment/Plan     This is a 68 year old male admitted to the ICU with urosepsis, secondary to an 8mm stone obstructing the left UPJ.   Imaging was reviewed which demonstrates the above, with moderate hydronephrosis.     Labs and vital signs reviewed. Patient is septic, hypotensive on vasopressors. IR consulted for urgent nephrostomy tube placement.   Discussed with the patient and family at bedside.     Risks were discussed including but not limited to pain at insertion site, infection, bleeding, or accidental dislodging of tube. " Benefits of the procedure and alternatives to treatment were also reviewed. Patient verbalized understanding and wishes to proceed. They will further discuss with IR attending prior to procedure.     Continue NPO, hold heparin and ASA.   Plan for left nephrostomy tube today.     Sedation Plan    ASA 3     Mallampati class: II.      NPO since midnight.       Nena Schmitz, MANAS-CNP

## 2024-09-12 NOTE — H&P
History Of Present Illness  Quinton Kennedy is a 68 y.o. male With Pmhx HTN HLD Parkinsons, BPH Prostate Ca s/p prostatectomy  Hx of kidney stone, Erectile Dysfunction and RADHA presented to [facility] Via EMS from home d/t shivering, nausea, vomiting and fever. Was Found by EMS with Rapid HR to the 150’s and was given 2 doses of adenosine prehospital. Complaints Associated with abdominal pain and flank pain. According to the patient symptoms onset was lunch time yesterday. He compares it to similar pain when he had kidney stones. About 2.5 weeks ago had flank pain and he had hematuria which  resolved.  Today flank pain and abdominal pain reoccurred, vomited x2 and was nauseous. Admits feeling chills.      ED findings: WBC 2.5lactic was 4.0 Scr 2.32 (baseline 0.97-1.08) BUN 32 U/A with proteinoria, 2+ blood 500 LE, Pyuria CT AbPelv showed Obstructing 8 mm stone at the left UPJ with moderate to severe left hydronephrosis.     He was given IV fluids, IV Antibiotic in the ED. Afebrile, tachynic 28 HR down to the 105’s He remained hypotensive despite reportedly 4L of IV fluids and needing Levophed and he is coming to the ICU for continued management            Past Medical History  Past Medical History:   Diagnosis Date    Coronary artery disease     Kidney stones     Parkinson's disease (Multi)        Surgical History  Past Surgical History:   Procedure Laterality Date    OTHER SURGICAL HISTORY  07/26/2013    Cardiovascular Stress Test    SHOULDER SURGERY  07/26/2013    Shoulder Surgery Right        Social History  Former remote smoker     Family History  Hypertension Mother       Diabetes Mother      Diabetes Maternal Grandmother      other (DM) Other      other (HTN) Other      Ischemic Heart Disease Other      Clotting Disorder No Family History      Anesthesia Problems No Family History      Bleeding disorder No Family History          Allergies  Compazine [prochlorperazine], Morphine, Phenergan  "[promethazine], and Reglan [metoclopramide hcl]    Review of Systems   Constitutional:  Positive for chills, diaphoresis, fatigue and fever.   Respiratory:  Negative for chest tightness, shortness of breath and wheezing.    Cardiovascular:  Negative for chest pain.   Gastrointestinal:  Positive for abdominal pain, nausea and vomiting. Negative for diarrhea.   Genitourinary:  Positive for flank pain. Negative for difficulty urinating.   Musculoskeletal:  Positive for back pain.   Neurological:  Positive for light-headedness.   Psychiatric/Behavioral:  Negative for agitation.         Physical Exam  Constitutional:       Appearance: He is ill-appearing.   HENT:      Head: Normocephalic.      Mouth/Throat:      Mouth: Mucous membranes are moist.   Eyes:      Extraocular Movements: Extraocular movements intact.      Pupils: Pupils are equal, round, and reactive to light.   Cardiovascular:      Rate and Rhythm: Rhythm irregular.   Pulmonary:      Effort: Pulmonary effort is normal.   Abdominal:      Palpations: Abdomen is soft.   Musculoskeletal:         General: Normal range of motion.   Skin:     General: Skin is warm.   Neurological:      Mental Status: He is oriented to person, place, and time.          Last Recorded Vitals  Blood pressure (!) 80/49, pulse (!) 113, temperature 37.1 °C (98.7 °F), resp. rate (!) 28, height 1.85 m (6' 0.84\"), weight 90.7 kg (200 lb), SpO2 94%.    Relevant Results  Scheduled medications  lactated Ringer's, 1,000 mL, intravenous, Once  lidocaine, 1 Application, urethral, Once      Continuous medications  norepinephrine, 0-0.2 mcg/kg/min, Last Rate: 0.03 mcg/kg/min (09/12/24 0340)      PRN medications  Results for orders placed or performed during the hospital encounter of 09/12/24 (from the past 24 hour(s))   CBC and Auto Differential   Result Value Ref Range    WBC 2.5 (L) 4.4 - 11.3 x10*3/uL    nRBC 3.2 (H) 0.0 - 0.0 /100 WBCs    RBC 3.68 (L) 4.50 - 5.90 x10*6/uL    Hemoglobin 12.5 (L) " 13.5 - 17.5 g/dL    Hematocrit 36.4 (L) 41.0 - 52.0 %    MCV 99 80 - 100 fL    MCH 34.0 26.0 - 34.0 pg    MCHC 34.3 32.0 - 36.0 g/dL    RDW 13.8 11.5 - 14.5 %    Platelets 184 150 - 450 x10*3/uL    Neutrophils % 81.4 40.0 - 80.0 %    Immature Granulocytes %, Automated 0.4 0.0 - 0.9 %    Lymphocytes % 5.7 13.0 - 44.0 %    Monocytes % 1.6 2.0 - 10.0 %    Eosinophils % 10.1 0.0 - 6.0 %    Basophils % 0.8 0.0 - 2.0 %    Neutrophils Absolute 2.01 1.20 - 7.70 x10*3/uL    Immature Granulocytes Absolute, Automated 0.01 0.00 - 0.70 x10*3/uL    Lymphocytes Absolute 0.14 (L) 1.20 - 4.80 x10*3/uL    Monocytes Absolute 0.04 (L) 0.10 - 1.00 x10*3/uL    Eosinophils Absolute 0.25 0.00 - 0.70 x10*3/uL    Basophils Absolute 0.02 0.00 - 0.10 x10*3/uL   Comprehensive Metabolic Panel   Result Value Ref Range    Glucose 128 (H) 74 - 99 mg/dL    Sodium 137 136 - 145 mmol/L    Potassium 4.1 3.5 - 5.3 mmol/L    Chloride 103 98 - 107 mmol/L    Bicarbonate 21 21 - 32 mmol/L    Anion Gap 17 10 - 20 mmol/L    Urea Nitrogen 32 (H) 6 - 23 mg/dL    Creatinine 2.32 (H) 0.50 - 1.30 mg/dL    eGFR 30 (L) >60 mL/min/1.73m*2    Calcium 8.4 (L) 8.6 - 10.3 mg/dL    Albumin 3.4 3.4 - 5.0 g/dL    Alkaline Phosphatase 77 33 - 136 U/L    Total Protein 5.6 (L) 6.4 - 8.2 g/dL    AST 28 9 - 39 U/L    Bilirubin, Total 2.2 (H) 0.0 - 1.2 mg/dL    ALT 8 (L) 10 - 52 U/L   Lactate   Result Value Ref Range    Lactate 4.0 (HH) 0.4 - 2.0 mmol/L   Lipase   Result Value Ref Range    Lipase 27 9 - 82 U/L   Troponin I, High Sensitivity   Result Value Ref Range    Troponin I, High Sensitivity 50 (H) 0 - 20 ng/L   Blood Gas Venous Full Panel   Result Value Ref Range    POCT pH, Venous 7.46 (H) 7.33 - 7.43 pH    POCT pCO2, Venous 31 (L) 41 - 51 mm Hg    POCT pO2, Venous 39 35 - 45 mm Hg    POCT SO2, Venous 68 45 - 75 %    POCT Oxy Hemoglobin, Venous 65.5 45.0 - 75.0 %    POCT Hematocrit Calculated, Venous 39.0 (L) 41.0 - 52.0 %    POCT Sodium, Venous 135 (L) 136 - 145 mmol/L     POCT Potassium, Venous 4.2 3.5 - 5.3 mmol/L    POCT Chloride, Venous 102 98 - 107 mmol/L    POCT Ionized Calicum, Venous 1.15 1.10 - 1.33 mmol/L    POCT Glucose, Venous 136 (H) 74 - 99 mg/dL    POCT Lactate, Venous 4.3 (HH) 0.4 - 2.0 mmol/L    POCT Base Excess, Venous -1.0 -2.0 - 3.0 mmol/L    POCT HCO3 Calculated, Venous 22.0 22.0 - 26.0 mmol/L    POCT Hemoglobin, Venous 13.1 (L) 13.5 - 17.5 g/dL    POCT Anion Gap, Venous 15.0 10.0 - 25.0 mmol/L    Patient Temperature 37.0 degrees Celsius    FiO2 30 %   B-type Natriuretic Peptide   Result Value Ref Range    BNP 98 0 - 99 pg/mL   Sars-CoV-2 PCR   Result Value Ref Range    Coronavirus 2019, PCR Not Detected Not Detected   Influenza A, and B PCR   Result Value Ref Range    Flu A Result Not Detected Not Detected    Flu B Result Not Detected Not Detected   Urinalysis with Reflex Culture and Microscopic   Result Value Ref Range    Color, Urine Light-Orange (N) Light-Yellow, Yellow, Dark-Yellow    Appearance, Urine Turbid (N) Clear    Specific Gravity, Urine 1.024 1.005 - 1.035    pH, Urine 5.5 5.0, 5.5, 6.0, 6.5, 7.0, 7.5, 8.0    Protein, Urine 50 (1+) (A) NEGATIVE, 10 (TRACE), 20 (TRACE) mg/dL    Glucose, Urine Normal Normal mg/dL    Blood, Urine 0.5 (2+) (A) NEGATIVE    Ketones, Urine TRACE (A) NEGATIVE mg/dL    Bilirubin, Urine NEGATIVE NEGATIVE    Urobilinogen, Urine Normal Normal mg/dL    Nitrite, Urine NEGATIVE NEGATIVE    Leukocyte Esterase, Urine 500 Mina/µL (A) NEGATIVE   Microscopic Only, Urine   Result Value Ref Range    WBC, Urine >50 (A) 1-5, NONE /HPF    RBC, Urine >20 (A) NONE, 1-2, 3-5 /HPF    Bacteria, Urine 4+ (A) NONE SEEN /HPF    Mucus, Urine FEW Reference range not established. /LPF   Lactate   Result Value Ref Range    Lactate 4.6 (HH) 0.4 - 2.0 mmol/L   Lactate   Result Value Ref Range    Lactate 4.4 (HH) 0.4 - 2.0 mmol/L         CT abdomen pelvis w IV contrast    Result Date: 9/12/2024  Interpreted By:  Finkelstein, Evan, STUDY: CT ABDOMEN PELVIS W  IV CONTRAST;  9/12/2024 1:37 am   INDICATION: Signs/Symptoms:Abdominal pain.     COMPARISON: CT abdomen pelvis 12/11/2015   ACCESSION NUMBER(S): [id number]   ORDERING CLINICIAN: DON MORTENSEN   TECHNIQUE: Axial CT images of the abdomen and pelvis with coronal and sagittal reconstructed images obtained after intravenous administration of 75 mL Omnipaque 350   FINDINGS: LOWER CHEST: Mild bibasilar atelectasis. There are coronary artery calcifications.   ABDOMEN:   LIVER: Normal attenuation and contour. BILE DUCTS: Normal caliber. GALLBLADDER: No calcified gallstones. No wall thickening. PORTAL VEIN: Patent SPLEEN: Unremarkable. PANCREAS: Unremarkable. ADRENALS: Unremarkable. KIDNEYS, URETERS and URINARY BLADDER: Nonobstructing stones in the right kidney measure up to near 5 mm. Mild right hydronephrosis. Normal caliber of the right ureter. Nonobstructing stones in the left kidney measure up to 5 mm. 8 mm obstructing stone at the left UPJ with moderate to severe left hydronephrosis and asymmetric left perinephric stranding. Concentric bladder wall thickening. REPRODUCTIVE ORGANS: No pelvic masses.   ABDOMINAL WALL: Fat containing inguinal hernias. PERITONEUM: No ascites, free air or fluid collection.   BOWEL: No dilated bowel.  Normal appendix.   VESSELS: Moderate aortoiliac calcifications. No aortic aneurysm. RETROPERITONEUM: Para-aortic lymph nodes measure up to 1.1 cm, increased compared to prior imaging.   BONES: No acute osseous abnormality.       Obstructing 8 mm stone at the left UPJ with moderate to severe left hydronephrosis.   Concentric bladder wall thickening which may represent cystitis. Correlate with urinalysis.   Nonobstructing stones in the right kidney measure up to near 5 mm. Mild right hydronephrosis with no obstructing ureteral stones. Nonobstructing stones in the left kidney measure up to 5 mm.   Nonspecific para-aortic lymph nodes measuring up to 1.1 cm, increased compared to prior imaging.    MACRO: None.   Signed by: Evan Finkelstein 9/12/2024 1:58 AM Dictation workstation:   [id number]    XR chest 2 views    Result Date: 9/12/2024  Interpreted By:  Finkelstein, Evan, STUDY: XR CHEST 2 VIEWS;  9/12/2024 12:54 am   INDICATION: Signs/Symptoms:Fever, hypoxia.     COMPARISON: None.   ACCESSION NUMBER(S): [id number]   ORDERING CLINICIAN: DON MORTENSEN   FINDINGS:     CARDIOMEDIASTINAL SILHOUETTE: Cardiomediastinal silhouette is normal in size and configuration.   LUNGS: No pulmonary consolidation, pleural effusion or pneumothorax.   ABDOMEN: No remarkable upper abdominal findings.   BONES: No acute osseous abnormality.       No radiographic evidence of acute cardiopulmonary pathology.   MACRO: None.   Signed by: Evan Finkelstein 9/12/2024 1:50 AM Dictation workstation:   [id number]    FL modified barium swallow study    Result Date: 9/9/2024  * * *Final Report* * * DATE OF EXAM: Sep  4 2024 11:30AM   HCX   5377  -  XR MOD BARIUM SWALLOW W SPEECH  / ACCESSION #  [id number] PROCEDURE REASON: Parkinson's disease, unspecified whether dyskinesia present, unspecified whether      * * * * Physician Interpretation * * * * RESULT: XR MOD BARIUM SWALLOW W SPEECH INDICATION: Parkinson's disease, unspecified whether dyskinesia present, unspecified whether manifestations fluctuate (HCC) RESULT:  Fluoroscopy was provided during performance of a swallowing evaluation by the speech pathologist. Fluoroscopic Radiation Summary: Plane A, Air Kerma:  19.0 mGy Dose Area Product (DAP): Fluoro time:  1:16 min:sec    IMPRESSION:  THE SPEECH PATHOLOGIST REPORT AND RECOMMENDATIONS ARE PRESENT IN THE REHABILITATION COMPONENT OF THE PATIENT'S CHART. Transcribed Using Voice Recognition Transcribe Date/Time: Sep  9 2024 10:46A Dictated by: FREIDA GUERRERO MD This examination was interpreted and the report reviewed and electronically signed by: FREIDA GUERRERO MD on Sep  9 2024 11:13AM  EST    XR lumbar spine 2-3  views    Result Date: 9/8/2024  * * *Final Report* * * DATE OF EXAM: Sep  7 2024 10:15AM   WHX   [id number]  -  XR LUMBAR 3V AP/LAT/L5-S1  / ACCESSION #  [id number] PROCEDURE REASON: multiple diagnoses      * * * * Physician Interpretation * * * *  REASON FOR STUDY:  Chronic right-sided low back pain without sciatica Chronic right-sided low back pain without sciatica  . PATIENT/TECHNOLOGIST-PROVIDED HISTORY: PT STS HE HSA HAD PAIN IN THE RIGHT LOWER BACK FOR 6 MONTHS, BUT IT GOT MUCH WORSE ABOUT A WEEK AGO / WAS ALSO HAVING PAIN ITHE RIGHT HIP AT THAT TIME, BUT NOT IN THE JIP NOW TECHNIQUE: XR LUMBAR 3V AP/LAT/L5-S1    Laterality:  NOT APPLICABLE    Number of different views (projections): 3 COMPARISON: No prior lumbar spine radiograph.  CT of the abdomen and pelvis from 12/24/2020 is available for correlation. RESULT: Lumbar spine: Counting reference:  Lumbosacral junction.  For the purposes of this report,  L4-5 is considered the level of the iliac crest and assume there are 5 lumbar-type vertebrae.  Anatomic variant:  None. The vertebral body heights are preserved.  Mild retrolisthesis is seen of L1 on L2.  There is mild thecal curvature of the lumbar spine, centered at L3.  Moderate facet arthropathy in the lower lumbar spine is present, most prominently at L4-5 and L5-S1. Multilevel degenerative disc disease with endplate marginal osteophytes and intervertebral disc space narrowing most prominently at T12-L1 and L3-L4 which are moderate, mild in the remaining levels.  L5-S1 demonstrates vacuum disc phenomenon with moderate degenerative disc disease.  Atherosclerotic calcification is noted in the aorta.  Symmetric mild degenerative changes in the bilateral sacroiliac joints are noted.  Mild degenerative changes of the hips bilaterally. Small amount of residual enteric contrast material. No other significant abnormality is seen. ---------------------------------------------    IMPRESSION: Moderate degenerative  spondylosis in the lumbar spine. : TULIO   Transcribe Date/Time: Sep  8 2024  7:24P Dictated by : EAMON ARRIOLA MD This examination was interpreted and the report reviewed and electronically signed by: EAMON ARRIOLA MD on Sep  8 2024  7:26PM  EST         Assessment/Plan    Pmhx HTN HLD Parkinsons, BPH Prostate Ca s/p.. Hx of kidney stone, Erectile Dysfunction and RADHA admittted to the ICU for  # Acute Renal Failure 2/2 Obstructing 8 mm stone at the left UPJ with moderate to severe left hydronephrosis.   #Septic Shock   # Lactic Acidosis   Assessment & Plan  Obstruction of left ureteropelvic junction (UPJ) due to stone    Kidney stone      Neuro:    -Q1 neuro check per ICU protocol   -CAM ICU score q-shift   -Sleep/wake cycle hygiene   -Delirium precaution   -Pain control, PRN pain meds  -Antipyretic     CV:    Hx  HLD HTN   -Hold HTN medication   -Continue vasopressors, MAP goal >65mmhg    -x1 1L IV fluids   -Continuous cardiac monitoring and hourly vital signs??   -Electrolytes daily monitoring     PULM:    -supplemental oxygen to keep spo2 > 92%     GI/FEN/Endo:??    -NPO,    -Daily electrolyte as replete as needed??   -Santino Lactic     Renal/: Acute Renal failure, Left hydronephrosis   -per Urology: plan for OR today cystoscopy and stent placement  -Pre op orders in  -Salazar   -ATB: Zosyn  -Reflex cx with U/A    -Strict I&O   -Daily weights   -Renal Panel in am   -ICU electrolyte replacement protocol- keep potassium >4, magnesium >2. Trend daily BMP, mag, phos.   -Avoid Nephrotoxic Agents, NSAIDs/ Renal dose medications?     Heme/ID:???   -SCDs   -pDVT- Heparin SubQ?   -ATB:  Zosyn     MSK/Integumentary:   Reposition, Pressure relieving measures     Lines: IV, Salazar , possibly be needing a central line if levophed dose continues to escalate  ???????????????????????????????????????????????????????????????????????????????????????????????????????????????????????????????????????????????????  Social/family/dispo: ICU    Code; Full.        I spent 74 minutes in the professional and overall care of this patient.      Serafin Smith, APRN-CNP

## 2024-09-12 NOTE — ASSESSMENT & PLAN NOTE
PMHx: Prostate CA s/p prostatectomy, parkinson's, kidney stones, elevated coronary artery calcium score, HTN, HLD, impaired fasting glucose.  Admitted to [facility] [place] with complaints of shivering, n/v, fever, and complaints of left flank pain.  Workup revealed septic shock with refractory hypotension despite 5 L of fluid requiring vasopressor support due to obstructive left renal stone in the renal pelvis with hydronephrosis who is now s/p placement 10F left nephrostomy tube in ER.    Neuro:    Hx parkinsons  - Neuro and pain assessment per unit protocol  - CAM ICU score q-shift   - Sleep/wake cycle hygiene   - Delirium precaution   - As needed tylenol for acute pain  - Tylenol for fever  - Home sinemet    CV:    Hx  HLD HTN   With Art-line & CVL  Septic shock due to obstructive UTI due to renal stone.  Hypotension refractory to IVF resuscitation requiring vasopressor support.  Has required increasing doses of support.  - Hold HTN medication   - Continue vasopressors, MAP goal >65mmhg    - ABP and tele-monitoring  - IVF boluses as needed  - Add stress dose steroids    PULM:    -supplemental oxygen to keep spo2 > 92%     GI:??    - NPO for IR procedure, then advance diet as tolerated.  - Add bowel regimen  - Pepcid    Renal:   Acute Renal failure, creatinine continuing to uptrend  Left hydronephrosis   Hyperlactacidemia    - Urology consulted - re Left hydronephrosis, appreciate recs.   - Continue ring for hourly I&Os  - Daily weights   - Daily RFP and replete electrolytes as needed  - Trend lactate    Heme  No s/s of acute bleeding  - SCDs and heparin subcutaneous for DVTppx  - Daily CBC    ID:??  Septic shock 2/2 renal obstruction and left hydronephrosis   - IR consult for left nephrostomy tube  - Follow blood and urine cultures  - Continue zosyn  - Will consider ID consult  - Trend temps  - Trend WBCs      Code; Full.

## 2024-09-13 LAB
ALBUMIN SERPL BCP-MCNC: 2.7 G/DL (ref 3.4–5)
ANION GAP SERPL CALC-SCNC: 17 MMOL/L (ref 10–20)
APPEARANCE UR: ABNORMAL
BACTERIA UR CULT: ABNORMAL
BILIRUB UR STRIP.AUTO-MCNC: NEGATIVE MG/DL
BUN SERPL-MCNC: 42 MG/DL (ref 6–23)
CALCIUM SERPL-MCNC: 6.8 MG/DL (ref 8.6–10.3)
CHLORIDE SERPL-SCNC: 106 MMOL/L (ref 98–107)
CO2 SERPL-SCNC: 17 MMOL/L (ref 21–32)
COLOR UR: YELLOW
CREAT SERPL-MCNC: 2.24 MG/DL (ref 0.5–1.3)
EGFRCR SERPLBLD CKD-EPI 2021: 31 ML/MIN/1.73M*2
ERYTHROCYTE [DISTWIDTH] IN BLOOD BY AUTOMATED COUNT: 14.1 % (ref 11.5–14.5)
GLUCOSE SERPL-MCNC: 195 MG/DL (ref 74–99)
GLUCOSE UR STRIP.AUTO-MCNC: ABNORMAL MG/DL
HCT VFR BLD AUTO: 31.4 % (ref 41–52)
HGB BLD-MCNC: 11 G/DL (ref 13.5–17.5)
KETONES UR STRIP.AUTO-MCNC: NEGATIVE MG/DL
LACTATE SERPL-SCNC: 3.7 MMOL/L (ref 0.4–2)
LEUKOCYTE ESTERASE UR QL STRIP.AUTO: ABNORMAL
MAGNESIUM SERPL-MCNC: 1.2 MG/DL (ref 1.6–2.4)
MCH RBC QN AUTO: 34.2 PG (ref 26–34)
MCHC RBC AUTO-ENTMCNC: 35 G/DL (ref 32–36)
MCV RBC AUTO: 98 FL (ref 80–100)
MUCOUS THREADS #/AREA URNS AUTO: ABNORMAL /LPF
NITRITE UR QL STRIP.AUTO: NEGATIVE
NRBC BLD-RTO: 0 /100 WBCS (ref 0–0)
PH UR STRIP.AUTO: 5.5 [PH]
PHOSPHATE SERPL-MCNC: 3.2 MG/DL (ref 2.5–4.9)
PLATELET # BLD AUTO: 98 X10*3/UL (ref 150–450)
POTASSIUM SERPL-SCNC: 4.1 MMOL/L (ref 3.5–5.3)
PROT UR STRIP.AUTO-MCNC: ABNORMAL MG/DL
RBC # BLD AUTO: 3.22 X10*6/UL (ref 4.5–5.9)
RBC # UR STRIP.AUTO: ABNORMAL /UL
RBC #/AREA URNS AUTO: >20 /HPF
SODIUM SERPL-SCNC: 136 MMOL/L (ref 136–145)
SP GR UR STRIP.AUTO: 1.03
UROBILINOGEN UR STRIP.AUTO-MCNC: NORMAL MG/DL
WBC # BLD AUTO: 29.5 X10*3/UL (ref 4.4–11.3)
WBC #/AREA URNS AUTO: ABNORMAL /HPF

## 2024-09-13 PROCEDURE — 2500000004 HC RX 250 GENERAL PHARMACY W/ HCPCS (ALT 636 FOR OP/ED): Performed by: NURSE PRACTITIONER

## 2024-09-13 PROCEDURE — 2500000001 HC RX 250 WO HCPCS SELF ADMINISTERED DRUGS (ALT 637 FOR MEDICARE OP): Performed by: NURSE PRACTITIONER

## 2024-09-13 PROCEDURE — 97167 OT EVAL HIGH COMPLEX 60 MIN: CPT | Mod: GO

## 2024-09-13 PROCEDURE — 2020000001 HC ICU ROOM DAILY

## 2024-09-13 PROCEDURE — 87086 URINE CULTURE/COLONY COUNT: CPT | Mod: AHULAB | Performed by: NURSE PRACTITIONER

## 2024-09-13 PROCEDURE — 80069 RENAL FUNCTION PANEL: CPT | Performed by: NURSE PRACTITIONER

## 2024-09-13 PROCEDURE — 99291 CRITICAL CARE FIRST HOUR: CPT | Performed by: NURSE PRACTITIONER

## 2024-09-13 PROCEDURE — 81001 URINALYSIS AUTO W/SCOPE: CPT | Performed by: NURSE PRACTITIONER

## 2024-09-13 PROCEDURE — 83735 ASSAY OF MAGNESIUM: CPT | Performed by: NURSE PRACTITIONER

## 2024-09-13 PROCEDURE — 97162 PT EVAL MOD COMPLEX 30 MIN: CPT | Mod: GP | Performed by: PHYSICAL THERAPIST

## 2024-09-13 PROCEDURE — 99232 SBSQ HOSP IP/OBS MODERATE 35: CPT

## 2024-09-13 PROCEDURE — 97530 THERAPEUTIC ACTIVITIES: CPT | Mod: GP | Performed by: PHYSICAL THERAPIST

## 2024-09-13 PROCEDURE — 85027 COMPLETE CBC AUTOMATED: CPT | Performed by: NURSE PRACTITIONER

## 2024-09-13 PROCEDURE — 37799 UNLISTED PX VASCULAR SURGERY: CPT | Performed by: NURSE PRACTITIONER

## 2024-09-13 PROCEDURE — 83605 ASSAY OF LACTIC ACID: CPT | Performed by: NURSE PRACTITIONER

## 2024-09-13 RX ORDER — MAGNESIUM SULFATE HEPTAHYDRATE 40 MG/ML
2 INJECTION, SOLUTION INTRAVENOUS ONCE
Status: COMPLETED | OUTPATIENT
Start: 2024-09-13 | End: 2024-09-13

## 2024-09-13 RX ADMIN — HEPARIN SODIUM 5000 UNITS: 5000 INJECTION INTRAVENOUS; SUBCUTANEOUS at 22:53

## 2024-09-13 RX ADMIN — ASPIRIN 81 MG: 81 TABLET, COATED ORAL at 09:04

## 2024-09-13 RX ADMIN — TRAZODONE HYDROCHLORIDE 50 MG: 50 TABLET ORAL at 22:53

## 2024-09-13 RX ADMIN — CARBIDOPA AND LEVODOPA 1.5 TABLET: 25; 100 TABLET ORAL at 07:32

## 2024-09-13 RX ADMIN — HYDROCORTISONE SODIUM SUCCINATE 50 MG: 100 INJECTION, POWDER, FOR SOLUTION INTRAMUSCULAR; INTRAVENOUS at 08:58

## 2024-09-13 RX ADMIN — VASOPRESSIN 0.03 UNITS/MIN: 0.2 INJECTION INTRAVENOUS at 10:50

## 2024-09-13 RX ADMIN — HEPARIN SODIUM 5000 UNITS: 5000 INJECTION INTRAVENOUS; SUBCUTANEOUS at 15:28

## 2024-09-13 RX ADMIN — HYDROCORTISONE SODIUM SUCCINATE 50 MG: 100 INJECTION, POWDER, FOR SOLUTION INTRAMUSCULAR; INTRAVENOUS at 01:44

## 2024-09-13 RX ADMIN — VASOPRESSIN 0.03 UNITS/MIN: 0.2 INJECTION INTRAVENOUS at 22:00

## 2024-09-13 RX ADMIN — PIPERACILLIN SODIUM AND TAZOBACTAM SODIUM 3.38 G: 3; .375 INJECTION, SOLUTION INTRAVENOUS at 20:58

## 2024-09-13 RX ADMIN — ATORVASTATIN CALCIUM 40 MG: 40 TABLET, FILM COATED ORAL at 22:53

## 2024-09-13 RX ADMIN — HYDROCORTISONE SODIUM SUCCINATE 50 MG: 100 INJECTION, POWDER, FOR SOLUTION INTRAMUSCULAR; INTRAVENOUS at 14:47

## 2024-09-13 RX ADMIN — HYDROCORTISONE SODIUM SUCCINATE 50 MG: 100 INJECTION, POWDER, FOR SOLUTION INTRAMUSCULAR; INTRAVENOUS at 20:58

## 2024-09-13 RX ADMIN — HEPARIN SODIUM 5000 UNITS: 5000 INJECTION INTRAVENOUS; SUBCUTANEOUS at 07:32

## 2024-09-13 RX ADMIN — PIPERACILLIN SODIUM AND TAZOBACTAM SODIUM 3.38 G: 3; .375 INJECTION, SOLUTION INTRAVENOUS at 15:28

## 2024-09-13 RX ADMIN — PIPERACILLIN SODIUM AND TAZOBACTAM SODIUM 3.38 G: 3; .375 INJECTION, SOLUTION INTRAVENOUS at 08:45

## 2024-09-13 RX ADMIN — MAGNESIUM SULFATE HEPTAHYDRATE 2 G: 40 INJECTION, SOLUTION INTRAVENOUS at 10:05

## 2024-09-13 RX ADMIN — CARBIDOPA AND LEVODOPA 1.5 TABLET: 25; 100 TABLET ORAL at 22:53

## 2024-09-13 RX ADMIN — CARBIDOPA AND LEVODOPA 1.5 TABLET: 25; 100 TABLET ORAL at 19:37

## 2024-09-13 RX ADMIN — MAGNESIUM SULFATE HEPTAHYDRATE 2 G: 40 INJECTION, SOLUTION INTRAVENOUS at 06:12

## 2024-09-13 RX ADMIN — FAMOTIDINE 20 MG: 20 TABLET, FILM COATED ORAL at 08:58

## 2024-09-13 RX ADMIN — CARBIDOPA AND LEVODOPA 1.5 TABLET: 25; 100 TABLET ORAL at 13:04

## 2024-09-13 RX ADMIN — PIPERACILLIN SODIUM AND TAZOBACTAM SODIUM 3.38 G: 3; .375 INJECTION, SOLUTION INTRAVENOUS at 01:44

## 2024-09-13 ASSESSMENT — COGNITIVE AND FUNCTIONAL STATUS - GENERAL
DRESSING REGULAR LOWER BODY CLOTHING: A LITTLE
HELP NEEDED FOR BATHING: A LITTLE
PERSONAL GROOMING: A LITTLE
TOILETING: A LITTLE
STANDING UP FROM CHAIR USING ARMS: A LITTLE
MOBILITY SCORE: 19
DAILY ACTIVITIY SCORE: 19
MOBILITY SCORE: 17
WALKING IN HOSPITAL ROOM: A LITTLE
STANDING UP FROM CHAIR USING ARMS: A LITTLE
DRESSING REGULAR UPPER BODY CLOTHING: A LITTLE
DRESSING REGULAR LOWER BODY CLOTHING: A LITTLE
WALKING IN HOSPITAL ROOM: A LITTLE
DAILY ACTIVITIY SCORE: 19
TURNING FROM BACK TO SIDE WHILE IN FLAT BAD: A LITTLE
MOVING TO AND FROM BED TO CHAIR: A LITTLE
MOVING TO AND FROM BED TO CHAIR: A LITTLE
DRESSING REGULAR UPPER BODY CLOTHING: A LITTLE
PERSONAL GROOMING: A LITTLE
MOVING FROM LYING ON BACK TO SITTING ON SIDE OF FLAT BED WITH BEDRAILS: A LITTLE
CLIMB 3 TO 5 STEPS WITH RAILING: A LOT
CLIMB 3 TO 5 STEPS WITH RAILING: A LOT
HELP NEEDED FOR BATHING: A LITTLE
TOILETING: A LITTLE

## 2024-09-13 ASSESSMENT — PAIN - FUNCTIONAL ASSESSMENT
PAIN_FUNCTIONAL_ASSESSMENT: 0-10

## 2024-09-13 ASSESSMENT — PAIN SCALES - GENERAL
PAINLEVEL_OUTOF10: 0 - NO PAIN
PAINLEVEL_OUTOF10: 5 - MODERATE PAIN
PAINLEVEL_OUTOF10: 0 - NO PAIN
PAINLEVEL_OUTOF10: 0 - NO PAIN
PAINLEVEL_OUTOF10: 5 - MODERATE PAIN
PAINLEVEL_OUTOF10: 0 - NO PAIN

## 2024-09-13 ASSESSMENT — ACTIVITIES OF DAILY LIVING (ADL)
ADL_ASSISTANCE: INDEPENDENT
BATHING_ASSISTANCE: MINIMAL
ADL_ASSISTANCE: INDEPENDENT

## 2024-09-13 NOTE — ASSESSMENT & PLAN NOTE
PMHx: Prostate CA s/p prostatectomy, parkinson's, kidney stones, elevated coronary artery calcium score, HTN, HLD, impaired fasting glucose.  [place] [facility] [place], n/v, fever, and complaints of left flank pain.  Workup revealed septic shock with refractory hypotension despite 5 L of fluid requiring vasopressor support due to obstructive left renal stone in the renal pelvis with hydronephrosis who is now s/p placement 10F left nephrostomy tube in ER.    Neuro:    Hx parkinsons  - Neuro and pain assessment per unit protocol  - CAM ICU score q-shift   - Sleep/wake cycle hygiene   - Delirium precaution   - As needed tylenol for acute pain  - Tylenol for fever  - Home sinemet  - PT/OT    CV:    Hx  HLD HTN   With Art-line & CVL  Septic shock due to obstructive UTI due to renal stone.  Hypotension refractory to IVF resuscitation requiring vasopressor support.   - Hold HTN medication   - Wean off vasopressors, MAP goal >65mmhg    - ABP and tele-monitoring  - IVF boluses as needed  - Continue stress dose steroids    PULM:    -supplemental oxygen to keep spo2 > 92%     GI:??    - Regular diet  - on bowel regimen  - Pepcid    Renal:   Acute Renal failure, creatinine now downtrending  Left hydronephrosis   Hyperlactacidemia    Now status post left nephrostomy tube, draining clear yellow urine  - Urology consulted - re-Left hydronephrosis, appreciate recs.   - Continue ring for hourly I&Os  - Daily weights   - Daily RFP and replete electrolytes as needed  - Trend lactate    Heme  No s/s of acute bleeding  - SCDs and heparin subcutaneous for DVTppx  - Daily CBC    ID:??  Septic shock 2/2 renal obstruction and left hydronephrosis   BC + Klebsiella pneumonia/variicola  UC suspected contamination  - Continue to follow cultures and await sensitivity  - Continue zosyn - deescalate following sensitivities  - Trend temps  - Trend WBCs      Code; Full.

## 2024-09-13 NOTE — PROGRESS NOTES
Occupational Therapy    Evaluation    Patient Name: Quinton Kennedy  MRN: [id number]  Department: [place] [address] ICU  Room: [facility]424  Today's Date: 9/13/2024  Time Calculation  Start Time: 1109  Stop Time: 1138  Time Calculation (min): 29 min        Assessment:  OT Assessment: Pt presents with impaired activity tolerance and balance, limiting ability to complete fxnl mob and ADLs at plof. Pt has a strong support system and his premorbid function was very active. OT recommending LOW intensity OT at d/c at this time.  Prognosis: Good  Barriers to Discharge: None  Evaluation/Treatment Tolerance: Patient tolerated treatment well  Medical Staff Made Aware: Yes  End of Session Communication: Bedside nurse  End of Session Patient Position:  (needs in reach)  OT Assessment Results: Decreased ADL status, Decreased endurance, Decreased functional mobility  Prognosis: Good  Barriers to Discharge: None  Evaluation/Treatment Tolerance: Patient tolerated treatment well  Medical Staff Made Aware: Yes  Strengths: Coping skills, Support and attitude of living partners, Physical health  Plan:  Treatment Interventions: ADL retraining, Functional transfer training, Endurance training, Patient/family training, Equipment evaluation/education, Neuromuscular reeducation  OT Frequency: 3 times per week  OT Discharge Recommendations: Low intensity level of continued care  Equipment Recommended upon Discharge: Wheeled walker  OT Recommended Transfer Status: Stand by assist, Assist of 1  OT - OK to Discharge: Yes  Treatment Interventions: ADL retraining, Functional transfer training, Endurance training, Patient/family training, Equipment evaluation/education, Neuromuscular reeducation    Subjective   Current Problem:  1. Kidney stone  US guided percutaneous placement    US guided percutaneous placement    CANCELED: FL less than 1 hour    CANCELED: FL less than 1 hour      2. ISABELLA (acute kidney injury) (CMS-HCC)        3. Severe sepsis (Multi)        4.  Obstruction of left ureteropelvic junction (UPJ) due to stone  US guided percutaneous placement    US guided percutaneous placement    IR nephrostomy tube exchange    CANCELED: Case Request Operating Room: Cystoscopy with Insertion Stent Ureter    CANCELED: Case Request Operating Room: Cystoscopy with Insertion Stent Ureter      5. Attention to nephrostomy (Multi)  IR nephrostomy tube exchange        General:  General  Reason for Referral: 67 y/o M to ED with abdominal pain, vomiting, shivers, tachycardia, and hypotension; admitted with urosepsis d/t L sided uretoplevic junction obstruction from a kidney stone. Underwent urgent cystoscopy for L nephrostomy tube placement in IR on 9/12/24.  Referred By: DENNIS Lemus  Past Medical History Relevant to Rehab:   Past Medical History:   Diagnosis Date    Coronary artery disease     Kidney stones     Parkinson's disease (Multi)      Co-Treatment: PT  Co-Treatment Reason: for improved safety and participation with OOB mobility for medically complex pt on pressor support with multiple critical lines.  Prior to Session Communication: Bedside nurse  Patient Position Received: Bed, 3 rail up  General Comment: Pt cleared for OOB mobility per ICU rounds and RN; pt agreeable to therapy evals.  Precautions:  Medical Precautions: Fall precautions  Pain:  Pain Assessment  Pain Assessment: 0-10  0-10 (Numeric) Pain Score: 5 - Moderate pain  Pain Type: Neuropathic pain  Pain Location:  (B hands and feet)    Objective   Cognition:  Orientation Level: Oriented X4  Sustained Attention: Impaired  Memory: Within Funtional Limits  Novel Situations: Moderate  Routine Tasks: Minimal      Home Living:  Type of Home: House  Lives With: Spouse  Home Adaptive Equipment: None  Home Layout: Two level, Bed/bath upstairs, Stairs to alternate level with rails  Alternate Level Stairs-Rails: Right  Alternate Level Stairs-Number of Steps: 13  Home Access: Stairs to enter without rails  Entrance  Stairs-Rails: None  Entrance Stairs-Number of Steps: 2  Bathroom Shower/Tub: Walk-in shower  Bathroom Toilet: Standard  Bathroom Equipment: None  Prior Function:  Level of [place]: Independent with ADLs and functional transfers, Independent with homemaking with ambulation  ADL Assistance: Independent  Homemaking Assistance: Independent  Ambulatory Assistance: Independent  Vocational: Retired  Prior Function Comments: Pt IND without devices; +drives; does OP therapy at [facility] [place].  ADL:  Eating Assistance: Independent  Grooming Assistance: Stand by  Grooming Deficit: Setup, Supervision/safety, Increased time to complete  Bathing Assistance: Minimal  Bathing Deficit: Right lower leg including foot, Left lower leg including foot, Increased time to complete , Supervision/safety, Verbal cueing, Steadying  UE Dressing Assistance: Moderate  UE Dressing Deficit:  (assist required due to lines and tubes, anticipate SUP without lines)  LE Dressing Assistance: Minimal  LE Dressing Deficit: Increased time to complete, Supervision/safety, Requires assistive device for steadying  Toileting Assistance with Device: Minimal  Toileting Deficit: Increased time to complete, Supervison/safety, Verbal cueing  Activity Tolerance:  Endurance: Tolerates 10 - 20 min exercise with multiple rests  Activity Tolerance Comments: Pt notably dyspneic while talking during session  Bed Mobility/Transfers: Bed Mobility 1  Bed Mobility 1: Supine to sitting  Level of Assistance 1: Contact guard  Bed Mobility Comments 1: cues for line management and awareness  Bed Mobility 2  Bed Mobility  2: Sitting to supine  Level of Assistance 2: Minimum assistance  Bed Mobility Comments 2: assist with LE's    Transfer 1  Transfer From 1: Bed to  Transfer to 1: Stand, Sit  Technique 1: Sit to stand, Stand to sit  Transfer Level of Assistance 1: Minimum assistance, +2, Minimal verbal cues  Trials/Comments 1: pt impulsive with standing up and gripper socks somewhat  slippery on the floor, requiring this writer to block pt's R foot.    Sitting Balance:  Static Sitting Balance  Static Sitting-Balance Support: Feet supported  Static Sitting-Level of Assistance: Close supervision  Dynamic Sitting Balance  Dynamic Sitting-Balance Support: Feet supported  Dynamic Sitting-Level of Assistance: Close supervision  Dynamic Sitting-Balance: Reaching for objects  Dynamic Sitting-Comments: donning socks  Standing Balance:  Static Standing Balance  Static Standing-Balance Support: Bilateral upper extremity supported  Static Standing-Level of Assistance: Contact guard (HHA)   Sensation:  Light Touch: Partial deficits in the RUE, Partial deficits in the LUE, Partial deficits in the RLE, Partial deficits in the LLE  Sharp/Dull: Partial deficits in the RUE, Partial deficits in the LUE, Partial deficits in the RLE  Stereognosis: Partial deficits in the RUE, Partial deficits in the LUE, Partial deficits in the RLE, Partial deficits in the LLE  Coordination:  Movements are Fluid and Coordinated: Yes   Hand Function:     Extremities: RUE   RUE : Within Functional Limits and LUE   LUE: Within Functional Limits    Outcome Measures:[facility] Daily Activity  Putting on and taking off regular lower body clothing: A little  Bathing (including washing, rinsing, drying): A little  Putting on and taking off regular upper body clothing: A little  Toileting, which includes using toilet, bedpan or urinal: A little  Taking care of personal grooming such as brushing teeth: A little  Eating Meals: None  Daily Activity - Total Score: 19    Education Documentation  Body Mechanics, taught by Lacy Glass OT at 9/13/2024  2:20 PM.  Learner: Patient  Readiness: Acceptance  Method: Explanation  Response: Verbalizes Understanding    Precautions, taught by Lacy Glass OT at 9/13/2024  2:20 PM.  Learner: Patient  Readiness: Acceptance  Method: Explanation  Response: Verbalizes Understanding    ADL Training, taught by  Lacy Glass OT at 9/13/2024  2:20 PM.  Learner: Patient  Readiness: Acceptance  Method: Explanation  Response: Verbalizes Understanding    Education Comments  No comments found.      Goals:  Encounter Problems       Encounter Problems (Active)       ADLs       Patient with complete upper body dressing with modified independent level of assistance donning and doffing all UE clothes with PRN adaptive equipment while edge of bed        Start:  09/13/24    Expected End:  09/27/24            Patient with complete lower body dressing with supervision level of assistance donning and doffing all LE clothes  with PRN adaptive equipment while edge of bed        Start:  09/13/24    Expected End:  09/27/24            Patient will complete daily grooming tasks brushing teeth and washing face/hair with supervision level of assistance and PRN adaptive equipment while standing sinkside.       Start:  09/13/24    Expected End:  09/27/24            Patient will complete toileting including hygiene clothing management/hygiene with supervision level of assistance and grab bars.       Start:  09/13/24    Expected End:  09/27/24               MOBILITY       Patient will perform Functional mobility max Household distances/Community Distances with supervision level of assistance and least restrictive device in order to improve safety and functional mobility.       Start:  09/13/24    Expected End:  09/27/24               TRANSFERS       Patient will perform bed mobility independent level of assistance in order to improve safety and independence with mobility       Start:  09/13/24    Expected End:  09/27/24            Patient will complete functional transfer to all surfaces with least restrictive device with modified independent level of assistance.       Start:  09/13/24    Expected End:  09/27/24

## 2024-09-13 NOTE — PROGRESS NOTES
09/13/24 1613   Discharge Planning   Expected Discharge Disposition Home H         Patient remains in ICU. He is S/P left nephrostomy tube placement. His BC +  for Klebsiella pneumonia/variicola. ID is following. Patient is on IV abx. Plan is to go home with [facility] when medically ready. Will follow for discharge needs.

## 2024-09-13 NOTE — PROGRESS NOTES
Physical Therapy    Physical Therapy Evaluation & Treatment    Patient Name: Quinton Kennedy  MRN: [id number]  Department: [place] [address] ICU  Room: [address] [place], [place] [address]  Today's Date: 9/13/2024   Time Calculation  Start Time: 1109  Stop Time: 1138  Time Calculation (min): 29 min    Assessment/Plan   PT Assessment  PT Assessment Results: Decreased endurance, Decreased mobility, Impaired balance, Decreased safety awareness, Pain  Rehab Prognosis: Excellent  Barriers to Discharge: Medical  Evaluation/Treatment Tolerance: Patient tolerated treatment well  Medical Staff Made Aware: Yes  Strengths: Ability to acquire knowledge, Capable of completing ADLs semi/independent, Premorbid level of function, Support and attitude of living partners  Barriers to Participation: Comorbidities, Housing layout  End of Session Communication: Bedside nurse  Assessment Comment: 68 year-old M presents with CASTRO, impaired activity tolerance, inability to negotiate stairs, decreased bed mobility, ambulation, and transfers, as well as unsteadiness; can benefit from skilled PT intervention to assist with discharge planning and address the aforementioned issues to enable him to return to his prior level of function, which was IND.   End of Session Patient Position:  (needs in reach)   IP OR SWING BED PT PLAN  Inpatient or Swing Bed: Inpatient  PT Plan  Treatment/Interventions: Bed mobility, Transfer training, Gait training, Stair training, Balance training, Neuromuscular re-education, Endurance training, Therapeutic exercise, Therapeutic activity  PT Plan: Ongoing PT  PT Frequency: 4 times per week  PT Discharge Recommendations: Low intensity level of continued care  Equipment Recommended upon Discharge: Wheeled walker (possibly)  PT Recommended Transfer Status: Assist x2 (min assist of 2 2/2 line management)  PT - OK to Discharge: Yes (per PT recs and POC)      Subjective     General Visit Information:  General  Reason for Referral: 69 y/o M to ED with abdominal  pain, vomiting, shivers, tachycardia, and hypotension; admitted with urosepsis d/t L sided uretoplevic junction obstruction from a kidney stone. Underwent urgent cystoscopy for L nephrostomy tube placement in IR on 9/12/24.  Referred By: DENNIS Lemus  Past Medical History Relevant to Rehab: HTN, HLD, prostate CA s/p prostatectomy  Past Medical History:   Diagnosis Date    Coronary artery disease     Kidney stones     Parkinson's disease (Multi)      Past Surgical History:   Procedure Laterality Date    OTHER SURGICAL HISTORY  07/26/2013    Cardiovascular Stress Test    SHOULDER SURGERY  07/26/2013    Shoulder Surgery Right       Co-Treatment: OT  Co-Treatment Reason: for improved safety and participation with OOB mobility for medically complex pt on pressor support with multiple critical lines.  Prior to Session Communication: Bedside nurse  Patient Position Received: Bed, 3 rail up  General Comment: Pt cleared for OOB mobility per ICU rounds and RN; pt agreeable to therapy evals.  Home Living:  Home Living  Type of Home: House  Lives With: Spouse  Home Adaptive Equipment: None  Home Layout: Two level, Bed/bath upstairs, Stairs to alternate level with rails  Alternate Level Stairs-Rails: Right  Alternate Level Stairs-Number of Steps: 13  Home Access: Stairs to enter without rails  Entrance Stairs-Rails: None  Entrance Stairs-Number of Steps: 2  Bathroom Shower/Tub: Walk-in shower  Bathroom Toilet: Standard  Bathroom Equipment: None  Prior Level of Function:  Prior Function Per Pt/Caregiver Report  Level of [place]: Independent with ADLs and functional transfers, Independent with homemaking with ambulation  ADL Assistance: Independent  Homemaking Assistance: Independent  Ambulatory Assistance: Independent  Vocational: Retired  Prior Function Comments: Pt IND without devices; +drives[facility]  Precautions:  Precautions  Medical Precautions: Fall precautions    Vital Signs  Vitals Session: Pre  PT  Heart Rate: 84  Heart Rate Source: Monitor  Resp: (!) 28  SpO2: 96 %  BP: (!) 93/45  MAP (mmHg): 62  BP Location: Left arm  BP Method: Arterial line  Patient Position: Lying     Vitals Session: Post PT  Heart Rate: 94  Heart Rate Source: Monitor  Resp: (!) 35  SpO2: 96 %  BP: (!) 122/46  MAP (mmHg): 69  BP Location: Left arm  BP Method: Arterial line  Patient Position: Lying (following sitting on EOB and standing at EOB)      Objective   Pain:  Pain Assessment  Pain Assessment: 0-10  0-10 (Numeric) Pain Score: 5 - Moderate pain  Pain Type: Neuropathic pain  Pain Location:  (hands and feet)  Pain Interventions: Ambulation/increased activity  Response to Interventions: PT to pt tolerance  Cognition:  Cognition  Overall Cognitive Status: Within Functional Limits  Orientation Level: Oriented X4  Attention: Exceptions to WFL  Sustained Attention: Impaired (vc's to redirect pt to tasks 2/2 anxiety about OOB mobility)  Memory: Within Funtional Limits  Safety/Judgement: Exceptions to WFL  Novel Situations: Moderate  Routine Tasks: Minimal  Insight: Within function limits  Impulsive: Mildly  Processing Speed: Within funtional limits    General Assessments:  Activity Tolerance  Endurance: Tolerates 10 - 20 min exercise with multiple rests  Early Mobility/Exercise Safety Screen: Proceed with mobilization - No exclusion criteria met  Activity Tolerance Comments: Pt notably dyspneic while talking during session    Coordination  Movements are Fluid and Coordinated: Yes    Postural Control  Postural Control: Within Functional Limits    Static Sitting Balance  Static Sitting-Balance Support: Bilateral upper extremity supported, Feet supported  Static Sitting-Level of Assistance: Distant supervision  Dynamic Sitting Balance  Dynamic Sitting-Balance Support: Bilateral upper extremity supported, Feet supported  Dynamic Sitting-Level of Assistance: Close supervision  Dynamic Sitting-Balance: Forward lean  Dynamic Sitting-Comments:  don/doff sock    Static Standing Balance  Static Standing-Balance Support: Bilateral upper extremity supported  Static Standing-Level of Assistance: Minimum assistance  Static Standing-Comment/Number of Minutes: stood grossly 3-4 minutes; hand held assist of 2  Dynamic Standing Balance  Dynamic Standing-Balance Support: Bilateral upper extremity supported  Dynamic Standing-Level of Assistance: Minimum assistance  Dynamic Standing-Balance:  (sidestepping,marching in place)  Dynamic Standing-Comments: stood grossly 3-4 minutes; hand held assist of 2  Functional Assessments:  Bed Mobility  Bed Mobility: Yes  Bed Mobility 1  Bed Mobility 1: Supine to sitting  Level of Assistance 1: Contact guard, Minimal verbal cues  Bed Mobility Comments 1: cues for line management and awareness  Bed Mobility 2  Bed Mobility  2: Sitting to supine  Level of Assistance 2: Minimum assistance, Minimal verbal cues  Bed Mobility Comments 2: assist with LE's    Transfers  Transfer: Yes  Transfer 1  Transfer From 1: Bed to  Transfer to 1: Sit, Stand  Technique 1: Sit to stand, Stand to sit  Transfer Level of Assistance 1: Minimum assistance, Hand held assistance, Minimal verbal cues, +2  Trials/Comments 1: pt impulsive with standing up and gripper socks somewhat slippery on the floor, requiring this writer to block pt's R foot.    Ambulation/Gait Training  Ambulation/Gait Training Performed: Yes  Ambulation/Gait Training 1  Surface 1: Level tile  Assistance 1: Minimum assistance, Hand held assistance, Minimal verbal cues  Quality of Gait 1: Decreased step length  Comments/Distance (ft) 1: 2'x2 along EOB  Extremity/Trunk Assessments:  RUE   RUE : Within Functional Limits  LUE   LUE: Within Functional Limits  RLE   RLE : Within Functional Limits  LLE   LLE : Within Functional Limits  Treatments:  Therapeutic Activity  Therapeutic Activity Performed: Yes  Therapeutic Activity 1: Pt educated in safe bed mobility technique moving supine->sit via  sidelying; pt requires minimal VC's for technique and proper UE placements for upper body initiate rolling and to use R UE to push up into sitting from sidelying.     Pt provided instruction in safe sit<->stand technique to enable him to move in/out of bed/chair safely; pt required minimal to moderate verbal cues for safety including proper hand placements and to scoot to edge of sitting surface to facilitate ease of sit->stand, and to line up to and reach back for sitting surface before sitting.    Pt instructed in pre-gait activities at EOB including marching in place and sidestepping along EOB with min assist of two and hand held assist.   Outcome Measures:  [facility] Basic Mobility  Turning from your back to your side while in a flat bed without using bedrails: A little  Moving from lying on your back to sitting on the side of a flat bed without using bedrails: A little  Moving to and from bed to chair (including a wheelchair): A little  Standing up from a chair using your arms (e.g. wheelchair or bedside chair): A little  To walk in hospital room: A little  Climbing 3-5 steps with railing: A lot  Basic Mobility - Total Score: 17    FSS-ICU  Ambulation: Walks <50 feet with any assistance x1 or walks any distance with assistance x2 people  Rolling: Minimal assistance (performs 75% or more of task)  Sitting: Supervision or set-up only  Transfer Sit-to-Stand: Minimal assistance (performs 75% or more of task)  Transfer Supine-to-Sit: Minimal assistance (performs 75% or more of task)  Total Score: 18      Early Mobility/Exercise Safety Screen: Proceed with mobilization - No exclusion criteria met  ICU Mobility Scale: Marching on spot (at bedside) [6]    Encounter Problems       Encounter Problems (Active)       PT Problem       Patient will be able to independently maintain dynamic standing balance during walking and reaching.       Start:  09/13/24    Expected End:  09/27/24            Patient will ambulate >150' on  level surfaces using LRD or no device independently.       Start:  09/13/24    Expected End:  09/27/24            Patient will ascend and descend 1 flight of stairs with rail and supervision.       Start:  09/13/24    Expected End:  09/27/24            Patient will perform sit<->stand transfer from EOB and chair independently.       Start:  09/13/24    Expected End:  09/27/24            Patient will perform supine to sit on flat bed with no rails independently demonstrating control.       Start:  09/13/24    Expected End:  09/27/24            Patient will ascend and descend 2 steps without rail with stand by assist.       Start:  09/13/24    Expected End:  09/27/24                   Pain - Adult              Education Documentation  Precautions, taught by Andreea Blandon PT at 9/13/2024  1:36 PM.  Learner: Patient  Readiness: Acceptance  Method: Explanation  Response: Verbalizes Understanding, Needs Reinforcement    Body Mechanics, taught by Andreea Blandon PT at 9/13/2024  1:36 PM.  Learner: Patient  Readiness: Acceptance  Method: Explanation  Response: Verbalizes Understanding, Needs Reinforcement    Mobility Training, taught by Andreea Blandon PT at 9/13/2024  1:36 PM.  Learner: Patient  Readiness: Acceptance  Method: Explanation  Response: Verbalizes Understanding, Needs Reinforcement    Education Comments  No comments found.

## 2024-09-13 NOTE — PROGRESS NOTES
Pharmacy Medication History Review   Spoke to the patient spouse, no med changes just added when the pt takes some meds    Quinton Kennedy is a 68 y.o. male admitted for Obstruction of left ureteropelvic junction (UPJ) due to stone. Pharmacy reviewed the patient's [id number] medications and allergies for accuracy.    The list below reflectives the updated PTA list. Please review each medication in order reconciliation for additional clarification and justification.     Prior to Admission Medications   Prescriptions Last Dose Informant   allopurinol (Zyloprim) 100 mg tablet     Sig: Take 1 tablet (100 mg) by mouth once daily.   aspirin 81 mg EC tablet 9/11/2024    Sig: Take 1 tablet (81 mg) by mouth once daily.   atorvastatin (Lipitor) 40 mg tablet 9/11/2024    Sig: Take 1 tablet (40 mg) by mouth once daily at bedtime.   carbidopa-levodopa (Parcopa)  mg disintegrating tablet     Sig: Take 1.5 tablets by mouth 4 times a day. 8a/2p/8p   cholecalciferol (Vitamin D3) 50 mcg (2,000 unit) capsule     Sig: Take 1 capsule (50 mcg) by mouth once daily.   lisinopril 10 mg tablet     Sig: Take 1 tablet (10 mg) by mouth once daily.   omega-3 (Fish OiL) 300-1,000 mg capsule     Sig: Take 1 capsule (1,000 mg) by mouth once daily.   pyridoxine (Vitamin B-6) 100 mg tablet     Sig: Take 1 tablet (100 mg) by mouth once daily.   traZODone (Desyrel) 50 mg tablet     Sig: Take 1 tablet (50 mg) by mouth once daily at bedtime.      Facility-Administered Medications: None       The list below reflectives the updated allergy list. Please review each documented allergy for additional clarification and justification.  Allergies  Reviewed by Amy Guillory RN on 9/12/2024        Severity Reactions Comments    Compazine [prochlorperazine] Not Specified Unknown     Morphine Not Specified Unknown rash, nausea    Phenergan [promethazine] Not Specified Unknown     Reglan [metoclopramide Hcl] Not Specified Unknown             Below are  additional concerns with the patient's PTA list.      Karen Matias

## 2024-09-13 NOTE — PROGRESS NOTES
"Quinton Kennedy is a 68 y.o. male on day 1 of admission presenting with Obstruction of left ureteropelvic junction (UPJ) due to stone.    Subjective   Feeling much better overall. Denies fever, chills, N/V or flank pain       Objective   Left sided neph tube insertion site C/D/I. Clear, valentin output noted. Creatinine is down trending as expected.     Physical Exam    Constitutional:       General: He is not in acute distress.  Cardiovascular:      Rate and Rhythm: Normal rate and regular rhythm.   Pulmonary:      Effort: Pulmonary effort is normal. No respiratory distress.      Breath sounds: WNL  Abdominal:      General: Abdomen is flat. Bowel sounds are normal. There is no distension.      Palpations: Abdomen is soft, nontender, BS+  Neurological:      Mental Status: He is alert and oriented      Last Recorded Vitals  Blood pressure 97/65, pulse 77, temperature 36.4 °C (97.5 °F), temperature source Temporal, resp. rate 24, height 1.829 m (6' 0.01\"), weight 97.6 kg (215 lb 2.7 oz), SpO2 97%.  Intake/Output last 3 Shifts:  I/O last 3 completed shifts:  In: 6609.9 (67.7 mL/kg) [P.O.:120; I.V.:1739.9 (17.8 mL/kg); IV Piggyback:4750]  Out: 1263 (12.9 mL/kg) [Urine:1263 (0.4 mL/kg/hr)]  Weight: 97.6 kg     Relevant Results                 Scheduled medications  aspirin, 81 mg, oral, Daily  atorvastatin, 40 mg, oral, Nightly  carbidopa-levodopa, 1.5 tablet, oral, 4x daily  famotidine, 20 mg, oral, Daily  heparin (porcine), 5,000 Units, subcutaneous, q8h  hydrocortisone sodium succinate, 50 mg, intravenous, q6h  ipratropium-albuteroL, 3 mL, nebulization, Once  magnesium sulfate, 2 g, intravenous, Once  piperacillin-tazobactam, 3.375 g, intravenous, q6h  polyethylene glycol, 17 g, oral, Daily      Continuous medications  norepinephrine, 0-0.5 mcg/kg/min, Last Rate: 0.03 mcg/kg/min (09/13/24 1009)  oxygen, , Last Rate: Stopped (09/12/24 1800)  vasopressin, 0-0.03 Units/min, Last Rate: 0.03 Units/min (09/13/24 1050)      PRN " medications  PRN medications: acetaminophen, alteplase, fentaNYL PF, iohexol, ipratropium-albuteroL, lidocaine, oxygen, oxygen, traZODone  Results for orders placed or performed during the hospital encounter of 09/12/24 (from the past 24 hour(s))   Renal Function Panel   Result Value Ref Range    Glucose 142 (H) 74 - 99 mg/dL    Sodium 134 (L) 136 - 145 mmol/L    Potassium 3.9 3.5 - 5.3 mmol/L    Chloride 105 98 - 107 mmol/L    Bicarbonate 16 (L) 21 - 32 mmol/L    Anion Gap 17 10 - 20 mmol/L    Urea Nitrogen 35 (H) 6 - 23 mg/dL    Creatinine 2.95 (H) 0.50 - 1.30 mg/dL    eGFR 22 (L) >60 mL/min/1.73m*2    Calcium 7.0 (L) 8.6 - 10.3 mg/dL    Phosphorus 3.2 2.5 - 4.9 mg/dL    Albumin 2.7 (L) 3.4 - 5.0 g/dL   POCT GLUCOSE   Result Value Ref Range    POCT Glucose 67 (L) 74 - 99 mg/dL   POCT GLUCOSE   Result Value Ref Range    POCT Glucose 137 (H) 74 - 99 mg/dL   Blood Gas Arterial Full Panel   Result Value Ref Range    POCT pH, Arterial 7.30 (L) 7.38 - 7.42 pH    POCT pCO2, Arterial 27 (L) 38 - 42 mm Hg    POCT pO2, Arterial 70 (L) 85 - 95 mm Hg    POCT SO2, Arterial 93 (L) 94 - 100 %    POCT Oxy Hemoglobin, Arterial 90.7 (L) 94.0 - 98.0 %    POCT Hematocrit Calculated, Arterial 43.0 41.0 - 52.0 %    POCT Sodium, Arterial 134 (L) 136 - 145 mmol/L    POCT Potassium, Arterial 4.0 3.5 - 5.3 mmol/L    POCT Chloride, Arterial 104 98 - 107 mmol/L    POCT Ionized Calcium, Arterial 0.99 (L) 1.10 - 1.33 mmol/L    POCT Glucose, Arterial 154 (H) 74 - 99 mg/dL    POCT Lactate, Arterial 5.7 (HH) 0.4 - 2.0 mmol/L    POCT Base Excess, Arterial -11.4 (L) -2.0 - 3.0 mmol/L    POCT HCO3 Calculated, Arterial 13.3 (L) 22.0 - 26.0 mmol/L    POCT Hemoglobin, Arterial 14.3 13.5 - 17.5 g/dL    POCT Anion Gap, Arterial 21 10 - 25 mmo/L    Patient Temperature 37.0 degrees Celsius    FiO2 36 %   CBC   Result Value Ref Range    WBC 29.5 (H) 4.4 - 11.3 x10*3/uL    nRBC 0.0 0.0 - 0.0 /100 WBCs    RBC 3.22 (L) 4.50 - 5.90 x10*6/uL    Hemoglobin 11.0  (L) 13.5 - 17.5 g/dL    Hematocrit 31.4 (L) 41.0 - 52.0 %    MCV 98 80 - 100 fL    MCH 34.2 (H) 26.0 - 34.0 pg    MCHC 35.0 32.0 - 36.0 g/dL    RDW 14.1 11.5 - 14.5 %    Platelets 98 (L) 150 - 450 x10*3/uL   Renal Function Panel   Result Value Ref Range    Glucose 195 (H) 74 - 99 mg/dL    Sodium 136 136 - 145 mmol/L    Potassium 4.1 3.5 - 5.3 mmol/L    Chloride 106 98 - 107 mmol/L    Bicarbonate 17 (L) 21 - 32 mmol/L    Anion Gap 17 10 - 20 mmol/L    Urea Nitrogen 42 (H) 6 - 23 mg/dL    Creatinine 2.24 (H) 0.50 - 1.30 mg/dL    eGFR 31 (L) >60 mL/min/1.73m*2    Calcium 6.8 (L) 8.6 - 10.3 mg/dL    Phosphorus 3.2 2.5 - 4.9 mg/dL    Albumin 2.7 (L) 3.4 - 5.0 g/dL   Magnesium   Result Value Ref Range    Magnesium 1.20 (L) 1.60 - 2.40 mg/dL   Lactate   Result Value Ref Range    Lactate 3.7 (H) 0.4 - 2.0 mmol/L         CT abdomen pelvis w IV contrast    Result Date: 9/12/2024  Interpreted By:  Finkelstein, Evan, STUDY: CT ABDOMEN PELVIS W IV CONTRAST;  9/12/2024 1:37 am   INDICATION: Signs/Symptoms:Abdominal pain.     COMPARISON: CT abdomen pelvis 12/11/2015   ACCESSION NUMBER(S): [id number]   ORDERING CLINICIAN: DON MORTENSEN   TECHNIQUE: Axial CT images of the abdomen and pelvis with coronal and sagittal reconstructed images obtained after intravenous administration of 75 mL Omnipaque 350   FINDINGS: LOWER CHEST: Mild bibasilar atelectasis. There are coronary artery calcifications.   ABDOMEN:   LIVER: Normal attenuation and contour. BILE DUCTS: Normal caliber. GALLBLADDER: No calcified gallstones. No wall thickening. PORTAL VEIN: Patent SPLEEN: Unremarkable. PANCREAS: Unremarkable. ADRENALS: Unremarkable. KIDNEYS, URETERS and URINARY BLADDER: Nonobstructing stones in the right kidney measure up to near 5 mm. Mild right hydronephrosis. Normal caliber of the right ureter. Nonobstructing stones in the left kidney measure up to 5 mm. 8 mm obstructing stone at the left UPJ with moderate to severe left hydronephrosis and  asymmetric left perinephric stranding. Concentric bladder wall thickening. REPRODUCTIVE ORGANS: No pelvic masses.   ABDOMINAL WALL: Fat containing inguinal hernias. PERITONEUM: No ascites, free air or fluid collection.   BOWEL: No dilated bowel.  Normal appendix.   VESSELS: Moderate aortoiliac calcifications. No aortic aneurysm. RETROPERITONEUM: Para-aortic lymph nodes measure up to 1.1 cm, increased compared to prior imaging.   BONES: No acute osseous abnormality.       Obstructing 8 mm stone at the left UPJ with moderate to severe left hydronephrosis.   Concentric bladder wall thickening which may represent cystitis. Correlate with urinalysis.   Nonobstructing stones in the right kidney measure up to near 5 mm. Mild right hydronephrosis with no obstructing ureteral stones. Nonobstructing stones in the left kidney measure up to 5 mm.   Nonspecific para-aortic lymph nodes measuring up to 1.1 cm, increased compared to prior imaging.   MACRO: None.   Signed by: Evan Finkelstein 9/12/2024 1:58 AM Dictation workstation:   [id number]                     Assessment/Plan     This is a 68 year old male admitted to the ICU with urosepsis, secondary to an 8mm stone obstructing the left UPJ. Imaging was reviewed which demonstrates the above, with moderate hydronephrosis.      S/p left sided nephrostomy tube placement 9/12/24. Quinton reports feeling better. Denies flank pain. Creatinine has come down slightly, WBC count remains elevated, likely from tube placement. I think we can anticipate this to start down trending in the coming days. Remains in the ICU on vasopressors, albeit a decreased dose. Will likely go home with the neph tube intact. Will need to follow up with urology for stone intervention.       Previously saw Dr. Nunn @ [facility] for prostate ca and previous kidney stones. Orders have been placed for neph tube exchange if he does not have stone intervention within the next 6-8 weeks.     Educated on care of  drain. No need to flush.     IR to follow peripherally over the weekend.          I spent 35 minutes in the professional and overall care of this patient.      Hugh Lopez, APRN-CNP

## 2024-09-13 NOTE — CARE PLAN
The patient's goals for the shift include to feel better    The clinical goals for the shift include BP will be normalized by the end of shift    Problem: Chronic Conditions and Co-morbidities  Goal: Patient's chronic conditions and co-morbidity symptoms are monitored and maintained or improved  Outcome: Progressing     Problem: Skin  Goal: Decreased wound size/increased tissue granulation at next dressing change  Flowsheets (Taken 9/12/2024 2239)  Decreased wound size/increased tissue granulation at next dressing change:   Utilize specialty bed per algorithm   Protective dressings over bony prominences   Promote sleep for wound healing  Goal: Participates in plan/prevention/treatment measures  Flowsheets (Taken 9/12/2024 2239)  Participates in plan/prevention/treatment measures:   Increase activity/out of bed for meals   Elevate heels  Goal: Prevent/manage excess moisture  Flowsheets (Taken 9/12/2024 2239)  Prevent/manage excess moisture:   Follow provider orders for dressing changes   Moisturize dry skin   Use wicking fabric (obtain order)   Monitor for/manage infection if present   Cleanse incontinence/protect with barrier cream  Goal: Prevent/minimize sheer/friction injuries  Flowsheets (Taken 9/12/2024 2239)  Prevent/minimize sheer/friction injuries:   HOB 30 degrees or less   Turn/reposition every 2 hours/use positioning/transfer devices   Utilize specialty bed per algorithm   Complete micro-shifts as needed if patient unable. Adjust patient position to relieve pressure points, not a full turn   Use pull sheet   Increase activity/out of bed for meals  Goal: Promote/optimize nutrition  Flowsheets (Taken 9/12/2024 2239)  Promote/optimize nutrition:   Monitor/record intake including meals   Assist with feeding  Goal: Promote skin healing  Flowsheets (Taken 9/12/2024 2239)  Promote skin healing:   Rotate device position/do not position patient on device   Ensure correct size (line/device) and apply per   instructions   Assess skin/pad under line(s)/device(s)   Protective dressings over bony prominences   Turn/reposition every 2 hours/use positioning/transfer devices

## 2024-09-13 NOTE — PROGRESS NOTES
Quinton Kennedy is a 68 y.o. male on day 1 of admission presenting with Obstruction of left ureteropelvic junction (UPJ) due to stone.    Subjective    Resting in bed on vasopressin    Objective     Physical Exam  Constitutional:       General: He is awake.      Appearance: He is well-developed and well-groomed. He is ill-appearing.   HENT:      Head: Normocephalic.   Cardiovascular:      Rate and Rhythm: Normal rate and regular rhythm.      Pulses:           Radial pulses are 2+ on the right side. Left radial pulse not accessible.        Popliteal pulses are 1+ on the right side and 1+ on the left side.        Dorsalis pedis pulses are 1+ on the right side and 1+ on the left side.        Posterior tibial pulses are 1+ on the right side and 1+ on the left side.      Heart sounds: Normal heart sounds.      Comments: Cold extremities, with numbness all extremities. Sensation intact.  Left radial with Art-line.  Pulmonary:      Effort: Tachypnea present.      Breath sounds: Normal breath sounds and air entry.   Abdominal:      General: Abdomen is flat. Bowel sounds are normal.      Palpations: Abdomen is soft.   Musculoskeletal:      Right lower leg: No edema.      Left lower leg: No edema.   Skin:     General: Skin is cool.      Capillary Refill: Capillary refill takes 2 to 3 seconds.      Coloration: Skin is pale.   Neurological:      Mental Status: He is alert and oriented to person, place, and time.      GCS: GCS eye subscore is 4. GCS verbal subscore is 5. GCS motor subscore is 6.   Psychiatric:         Attention and Perception: Attention and perception normal.         Mood and Affect: Mood and affect normal.         Speech: Speech normal.         Behavior: Behavior normal. Behavior is cooperative.         Thought Content: Thought content normal.         Cognition and Memory: Cognition normal.         Judgment: Judgment normal.       Last Recorded Vitals  Blood pressure 97/65, pulse 77, temperature 36.4 °C (97.5  "°F), temperature source Temporal, resp. rate 24, height 1.829 m (6' 0.01\"), weight 97.6 kg (215 lb 2.7 oz), SpO2 97%.  Intake/Output last 3 Shifts:  I/O last 3 completed shifts:  In: 6609.9 (67.7 mL/kg) [P.O.:120; I.V.:1739.9 (17.8 mL/kg); IV Piggyback:4750]  Out: 1263 (12.9 mL/kg) [Urine:1263 (0.4 mL/kg/hr)]  Weight: 97.6 kg     Relevant Results  Results for orders placed or performed during the hospital encounter of 09/12/24 (from the past 24 hour(s))   Blood Gas Arterial Full Panel   Result Value Ref Range    POCT pH, Arterial 7.36 (L) 7.38 - 7.42 pH    POCT pCO2, Arterial 24 (L) 38 - 42 mm Hg    POCT pO2, Arterial 70 (L) 85 - 95 mm Hg    POCT SO2, Arterial 94 94 - 100 %    POCT Oxy Hemoglobin, Arterial 92.3 (L) 94.0 - 98.0 %    POCT Hematocrit Calculated, Arterial 31.0 (L) 41.0 - 52.0 %    POCT Sodium, Arterial 136 136 - 145 mmol/L    POCT Potassium, Arterial 2.9 (LL) 3.5 - 5.3 mmol/L    POCT Chloride, Arterial 109 (H) 98 - 107 mmol/L    POCT Ionized Calcium, Arterial 0.96 (L) 1.10 - 1.33 mmol/L    POCT Glucose, Arterial 114 (H) 74 - 99 mg/dL    POCT Lactate, Arterial 3.5 (H) 0.4 - 2.0 mmol/L    POCT Base Excess, Arterial -10.3 (L) -2.0 - 3.0 mmol/L    POCT HCO3 Calculated, Arterial 13.6 (L) 22.0 - 26.0 mmol/L    POCT Hemoglobin, Arterial 10.4 (L) 13.5 - 17.5 g/dL    POCT Anion Gap, Arterial 16 10 - 25 mmo/L    Patient Temperature 37.0 degrees Celsius    FiO2 24 %   Renal Function Panel   Result Value Ref Range    Glucose 142 (H) 74 - 99 mg/dL    Sodium 134 (L) 136 - 145 mmol/L    Potassium 3.9 3.5 - 5.3 mmol/L    Chloride 105 98 - 107 mmol/L    Bicarbonate 16 (L) 21 - 32 mmol/L    Anion Gap 17 10 - 20 mmol/L    Urea Nitrogen 35 (H) 6 - 23 mg/dL    Creatinine 2.95 (H) 0.50 - 1.30 mg/dL    eGFR 22 (L) >60 mL/min/1.73m*2    Calcium 7.0 (L) 8.6 - 10.3 mg/dL    Phosphorus 3.2 2.5 - 4.9 mg/dL    Albumin 2.7 (L) 3.4 - 5.0 g/dL   POCT GLUCOSE   Result Value Ref Range    POCT Glucose 67 (L) 74 - 99 mg/dL   POCT GLUCOSE "   Result Value Ref Range    POCT Glucose 137 (H) 74 - 99 mg/dL   Blood Gas Arterial Full Panel   Result Value Ref Range    POCT pH, Arterial 7.30 (L) 7.38 - 7.42 pH    POCT pCO2, Arterial 27 (L) 38 - 42 mm Hg    POCT pO2, Arterial 70 (L) 85 - 95 mm Hg    POCT SO2, Arterial 93 (L) 94 - 100 %    POCT Oxy Hemoglobin, Arterial 90.7 (L) 94.0 - 98.0 %    POCT Hematocrit Calculated, Arterial 43.0 41.0 - 52.0 %    POCT Sodium, Arterial 134 (L) 136 - 145 mmol/L    POCT Potassium, Arterial 4.0 3.5 - 5.3 mmol/L    POCT Chloride, Arterial 104 98 - 107 mmol/L    POCT Ionized Calcium, Arterial 0.99 (L) 1.10 - 1.33 mmol/L    POCT Glucose, Arterial 154 (H) 74 - 99 mg/dL    POCT Lactate, Arterial 5.7 (HH) 0.4 - 2.0 mmol/L    POCT Base Excess, Arterial -11.4 (L) -2.0 - 3.0 mmol/L    POCT HCO3 Calculated, Arterial 13.3 (L) 22.0 - 26.0 mmol/L    POCT Hemoglobin, Arterial 14.3 13.5 - 17.5 g/dL    POCT Anion Gap, Arterial 21 10 - 25 mmo/L    Patient Temperature 37.0 degrees Celsius    FiO2 36 %   CBC   Result Value Ref Range    WBC 29.5 (H) 4.4 - 11.3 x10*3/uL    nRBC 0.0 0.0 - 0.0 /100 WBCs    RBC 3.22 (L) 4.50 - 5.90 x10*6/uL    Hemoglobin 11.0 (L) 13.5 - 17.5 g/dL    Hematocrit 31.4 (L) 41.0 - 52.0 %    MCV 98 80 - 100 fL    MCH 34.2 (H) 26.0 - 34.0 pg    MCHC 35.0 32.0 - 36.0 g/dL    RDW 14.1 11.5 - 14.5 %    Platelets 98 (L) 150 - 450 x10*3/uL   Renal Function Panel   Result Value Ref Range    Glucose 195 (H) 74 - 99 mg/dL    Sodium 136 136 - 145 mmol/L    Potassium 4.1 3.5 - 5.3 mmol/L    Chloride 106 98 - 107 mmol/L    Bicarbonate 17 (L) 21 - 32 mmol/L    Anion Gap 17 10 - 20 mmol/L    Urea Nitrogen 42 (H) 6 - 23 mg/dL    Creatinine 2.24 (H) 0.50 - 1.30 mg/dL    eGFR 31 (L) >60 mL/min/1.73m*2    Calcium 6.8 (L) 8.6 - 10.3 mg/dL    Phosphorus 3.2 2.5 - 4.9 mg/dL    Albumin 2.7 (L) 3.4 - 5.0 g/dL   Magnesium   Result Value Ref Range    Magnesium 1.20 (L) 1.60 - 2.40 mg/dL   Lactate   Result Value Ref Range    Lactate 3.7 (H) 0.4 -  2.0 mmol/L           This patient has a central line   Reason for the central line remaining today? Hemodynamic monitoring      Assessment/Plan   Assessment & Plan  Obstruction of left ureteropelvic junction (UPJ) due to stone    Kidney stone  PMHx: Prostate CA s/p prostatectomy, parkinson's, kidney stones, elevated coronary artery calcium score, HTN, HLD, impaired fasting glucose.  Admitted to [facility] with complaints of shivering, n/v, fever, and complaints of left flank pain.  Workup revealed septic shock with refractory hypotension despite 5 L of fluid requiring vasopressor support due to obstructive left renal stone in the renal pelvis with hydronephrosis who is now s/p placement 10F left nephrostomy tube in ER.    Neuro:    Hx parkinsons  - Neuro and pain assessment per unit protocol  - CAM ICU score q-shift   - Sleep/wake cycle hygiene   - Delirium precaution   - As needed tylenol for acute pain  - Tylenol for fever  - Home sinemet  - PT/OT    CV:    Hx  HLD HTN   With Art-line & CVL  Septic shock due to obstructive UTI due to renal stone.  Hypotension refractory to IVF resuscitation requiring vasopressor support.   - Hold HTN medication   - Wean off vasopressors, MAP goal >65mmhg    - ABP and tele-monitoring  - IVF boluses as needed  - Continue stress dose steroids    PULM:    -supplemental oxygen to keep spo2 > 92%     GI:??    - Regular diet  - on bowel regimen  - Pepcid    Renal:   Acute Renal failure, creatinine now downtrending  Left hydronephrosis   Hyperlactacidemia    Now status post left nephrostomy tube, draining clear yellow urine  - Urology consulted - re-Left hydronephrosis, appreciate recs.   - Continue ring for hourly I&Os  - Daily weights   - Daily RFP and replete electrolytes as needed  - Trend lactate    Heme  No s/s of acute bleeding  - SCDs and heparin subcutaneous for DVTppx  - Daily CBC    ID:??  Septic shock 2/2 renal obstruction and left hydronephrosis   BC + Klebsiella  pneumonia/variicola  UC suspected contamination  - Continue to follow cultures and await sensitivity  - Continue zosyn - deescalate following sensitivities  - Trend temps  - Trend WBCs      Code; Full.        LOUIE ERVIN    I was present with the student who participated in the documentation of this note. I personally saw and evaluated the patient and performed my own history and examination. I discussed the case with the student. I have reviewed, verified, and revised the note as necessary and agree with the content and plan as written by the student.     I spent 60 minutes of critical car time in the professional and overall care of this patient.     Bart Lemus, CNP

## 2024-09-14 LAB
ALBUMIN SERPL BCP-MCNC: 2.7 G/DL (ref 3.4–5)
ANION GAP SERPL CALC-SCNC: 14 MMOL/L (ref 10–20)
ATRIAL RATE: 134 BPM
BACTERIA BLD AEROBE CULT: ABNORMAL
BACTERIA BLD AEROBE CULT: ABNORMAL
BACTERIA BLD CULT: ABNORMAL
BACTERIA BLD CULT: ABNORMAL
BUN SERPL-MCNC: 55 MG/DL (ref 6–23)
CALCIUM SERPL-MCNC: 6.9 MG/DL (ref 8.6–10.3)
CHLORIDE SERPL-SCNC: 106 MMOL/L (ref 98–107)
CO2 SERPL-SCNC: 21 MMOL/L (ref 21–32)
CREAT SERPL-MCNC: 2 MG/DL (ref 0.5–1.3)
EGFRCR SERPLBLD CKD-EPI 2021: 35 ML/MIN/1.73M*2
ERYTHROCYTE [DISTWIDTH] IN BLOOD BY AUTOMATED COUNT: 14.5 % (ref 11.5–14.5)
GLUCOSE SERPL-MCNC: 150 MG/DL (ref 74–99)
GRAM STN SPEC: ABNORMAL
GRAM STN SPEC: ABNORMAL
HCT VFR BLD AUTO: 29.5 % (ref 41–52)
HGB BLD-MCNC: 10.1 G/DL (ref 13.5–17.5)
MAGNESIUM SERPL-MCNC: 2.3 MG/DL (ref 1.6–2.4)
MCH RBC QN AUTO: 33.8 PG (ref 26–34)
MCHC RBC AUTO-ENTMCNC: 34.2 G/DL (ref 32–36)
MCV RBC AUTO: 99 FL (ref 80–100)
NRBC BLD-RTO: 0 /100 WBCS (ref 0–0)
P AXIS: 53 DEGREES
P OFFSET: 198 MS
P ONSET: 143 MS
PHOSPHATE SERPL-MCNC: 2.1 MG/DL (ref 2.5–4.9)
PLATELET # BLD AUTO: 103 X10*3/UL (ref 150–450)
POTASSIUM SERPL-SCNC: 3.8 MMOL/L (ref 3.5–5.3)
PR INTERVAL: 140 MS
Q ONSET: 213 MS
QRS COUNT: 22 BEATS
QRS DURATION: 94 MS
QT INTERVAL: 304 MS
QTC CALCULATION(BAZETT): 453 MS
QTC FREDERICIA: 397 MS
R AXIS: -45 DEGREES
RBC # BLD AUTO: 2.99 X10*6/UL (ref 4.5–5.9)
SODIUM SERPL-SCNC: 137 MMOL/L (ref 136–145)
T AXIS: 80 DEGREES
T OFFSET: 365 MS
VENTRICULAR RATE: 134 BPM
WBC # BLD AUTO: 28.9 X10*3/UL (ref 4.4–11.3)

## 2024-09-14 PROCEDURE — 97110 THERAPEUTIC EXERCISES: CPT | Mod: GP,CQ

## 2024-09-14 PROCEDURE — 9420000001 HC RT PATIENT EDUCATION 5 MIN

## 2024-09-14 PROCEDURE — 2500000004 HC RX 250 GENERAL PHARMACY W/ HCPCS (ALT 636 FOR OP/ED): Performed by: ANESTHESIOLOGY

## 2024-09-14 PROCEDURE — 2500000004 HC RX 250 GENERAL PHARMACY W/ HCPCS (ALT 636 FOR OP/ED): Performed by: NURSE PRACTITIONER

## 2024-09-14 PROCEDURE — 97530 THERAPEUTIC ACTIVITIES: CPT | Mod: GP,CQ

## 2024-09-14 PROCEDURE — 83735 ASSAY OF MAGNESIUM: CPT | Performed by: NURSE PRACTITIONER

## 2024-09-14 PROCEDURE — 80069 RENAL FUNCTION PANEL: CPT | Performed by: NURSE PRACTITIONER

## 2024-09-14 PROCEDURE — 2500000001 HC RX 250 WO HCPCS SELF ADMINISTERED DRUGS (ALT 637 FOR MEDICARE OP): Performed by: NURSE PRACTITIONER

## 2024-09-14 PROCEDURE — 99291 CRITICAL CARE FIRST HOUR: CPT | Performed by: SURGERY

## 2024-09-14 PROCEDURE — 2500000004 HC RX 250 GENERAL PHARMACY W/ HCPCS (ALT 636 FOR OP/ED): Performed by: SURGERY

## 2024-09-14 PROCEDURE — 2500000005 HC RX 250 GENERAL PHARMACY W/O HCPCS: Performed by: NURSE PRACTITIONER

## 2024-09-14 PROCEDURE — 2500000001 HC RX 250 WO HCPCS SELF ADMINISTERED DRUGS (ALT 637 FOR MEDICARE OP): Performed by: SURGERY

## 2024-09-14 PROCEDURE — 2020000001 HC ICU ROOM DAILY

## 2024-09-14 PROCEDURE — 85027 COMPLETE CBC AUTOMATED: CPT | Performed by: NURSE PRACTITIONER

## 2024-09-14 PROCEDURE — 2500000005 HC RX 250 GENERAL PHARMACY W/O HCPCS: Performed by: RADIOLOGY

## 2024-09-14 RX ORDER — FUROSEMIDE 10 MG/ML
20 INJECTION INTRAMUSCULAR; INTRAVENOUS ONCE
Status: COMPLETED | OUTPATIENT
Start: 2024-09-14 | End: 2024-09-14

## 2024-09-14 RX ORDER — TRAZODONE HYDROCHLORIDE 50 MG/1
100 TABLET ORAL NIGHTLY PRN
Status: DISCONTINUED | OUTPATIENT
Start: 2024-09-14 | End: 2024-09-17 | Stop reason: HOSPADM

## 2024-09-14 RX ADMIN — PIPERACILLIN SODIUM AND TAZOBACTAM SODIUM 3.38 G: 3; .375 INJECTION, SOLUTION INTRAVENOUS at 01:54

## 2024-09-14 RX ADMIN — FAMOTIDINE 20 MG: 20 TABLET, FILM COATED ORAL at 09:30

## 2024-09-14 RX ADMIN — HEPARIN SODIUM 5000 UNITS: 5000 INJECTION INTRAVENOUS; SUBCUTANEOUS at 23:01

## 2024-09-14 RX ADMIN — HEPARIN SODIUM 5000 UNITS: 5000 INJECTION INTRAVENOUS; SUBCUTANEOUS at 07:21

## 2024-09-14 RX ADMIN — CEFTRIAXONE 2 G: 2 INJECTION, POWDER, FOR SOLUTION INTRAMUSCULAR; INTRAVENOUS at 04:20

## 2024-09-14 RX ADMIN — Medication 2 L/MIN: at 19:16

## 2024-09-14 RX ADMIN — TRAZODONE HYDROCHLORIDE 100 MG: 50 TABLET ORAL at 23:00

## 2024-09-14 RX ADMIN — Medication 4 L/MIN: at 11:43

## 2024-09-14 RX ADMIN — HYDROCORTISONE SODIUM SUCCINATE 50 MG: 100 INJECTION, POWDER, FOR SOLUTION INTRAMUSCULAR; INTRAVENOUS at 19:43

## 2024-09-14 RX ADMIN — CARBIDOPA AND LEVODOPA 1.5 TABLET: 25; 100 TABLET ORAL at 23:00

## 2024-09-14 RX ADMIN — CARBIDOPA AND LEVODOPA 1.5 TABLET: 25; 100 TABLET ORAL at 13:52

## 2024-09-14 RX ADMIN — Medication 4 L/MIN: at 04:00

## 2024-09-14 RX ADMIN — HYDROCORTISONE SODIUM SUCCINATE 50 MG: 100 INJECTION, POWDER, FOR SOLUTION INTRAMUSCULAR; INTRAVENOUS at 07:20

## 2024-09-14 RX ADMIN — CARBIDOPA AND LEVODOPA 1.5 TABLET: 25; 100 TABLET ORAL at 19:42

## 2024-09-14 RX ADMIN — ASPIRIN 81 MG: 81 TABLET, COATED ORAL at 09:30

## 2024-09-14 RX ADMIN — HYDROCORTISONE SODIUM SUCCINATE 50 MG: 100 INJECTION, POWDER, FOR SOLUTION INTRAMUSCULAR; INTRAVENOUS at 01:54

## 2024-09-14 RX ADMIN — CARBIDOPA AND LEVODOPA 1.5 TABLET: 25; 100 TABLET ORAL at 07:21

## 2024-09-14 RX ADMIN — ATORVASTATIN CALCIUM 40 MG: 40 TABLET, FILM COATED ORAL at 19:43

## 2024-09-14 RX ADMIN — HEPARIN SODIUM 5000 UNITS: 5000 INJECTION INTRAVENOUS; SUBCUTANEOUS at 15:36

## 2024-09-14 RX ADMIN — FUROSEMIDE 20 MG: 10 INJECTION, SOLUTION INTRAMUSCULAR; INTRAVENOUS at 11:37

## 2024-09-14 ASSESSMENT — PAIN SCALES - GENERAL
PAINLEVEL_OUTOF10: 0 - NO PAIN

## 2024-09-14 ASSESSMENT — COGNITIVE AND FUNCTIONAL STATUS - GENERAL
MOVING FROM LYING ON BACK TO SITTING ON SIDE OF FLAT BED WITH BEDRAILS: A LITTLE
TURNING FROM BACK TO SIDE WHILE IN FLAT BAD: A LITTLE
CLIMB 3 TO 5 STEPS WITH RAILING: A LOT
CLIMB 3 TO 5 STEPS WITH RAILING: A LOT
TURNING FROM BACK TO SIDE WHILE IN FLAT BAD: A LITTLE
TOILETING: A LITTLE
STANDING UP FROM CHAIR USING ARMS: A LITTLE
DRESSING REGULAR LOWER BODY CLOTHING: A LITTLE
MOVING TO AND FROM BED TO CHAIR: A LITTLE
MOVING FROM LYING ON BACK TO SITTING ON SIDE OF FLAT BED WITH BEDRAILS: A LITTLE
DAILY ACTIVITIY SCORE: 19
MOBILITY SCORE: 17
WALKING IN HOSPITAL ROOM: A LITTLE
STANDING UP FROM CHAIR USING ARMS: A LITTLE
HELP NEEDED FOR BATHING: A LITTLE
PERSONAL GROOMING: A LITTLE
MOVING TO AND FROM BED TO CHAIR: A LITTLE
MOBILITY SCORE: 17
DRESSING REGULAR UPPER BODY CLOTHING: A LITTLE
WALKING IN HOSPITAL ROOM: A LITTLE

## 2024-09-14 ASSESSMENT — PAIN - FUNCTIONAL ASSESSMENT
PAIN_FUNCTIONAL_ASSESSMENT: 0-10

## 2024-09-14 NOTE — PROGRESS NOTES
"Quinton Kennedy is a 68 y.o. male on day 2 of admission presenting with Obstruction of left ureteropelvic junction (UPJ) due to stone.    Subjective   Off vasopressor therapy this morning.  Increasing oxygen requirements, suspect combination of atelectasis and fluid overload, given dose of lasix. Appears overall better. Abx changed overnight to ceftriaxone consistent with sensitivities of pseudomonas.    Objective     PHYSICAL EXAM    Awake and alert  Color improved  Mild tachypnea, wheeze noted on left  Abd soft, tolerating regular diet  Ext warm, non-pitting edema  Urine clear in ring bag and nephrostomy tube    Last Recorded Vitals  Blood pressure 93/56, pulse 70, temperature 36.6 °C (97.9 °F), temperature source Temporal, resp. rate 26, height 1.829 m (6' 0.01\"), weight 97.6 kg (215 lb 2.7 oz), SpO2 96%.  Intake/Output last 3 Shifts:  I/O last 3 completed shifts:  In: 2364.9 (24.2 mL/kg) [P.O.:300; I.V.:1664.9 (17.1 mL/kg); IV Piggyback:400]  Out: 1982 (20.3 mL/kg) [Urine:1982 (0.6 mL/kg/hr)]  Weight: 97.6 kg     Relevant Results  Results for orders placed or performed during the hospital encounter of 09/12/24 (from the past 24 hour(s))   Urinalysis with Reflex Culture and Microscopic   Result Value Ref Range    Color, Urine Yellow Light-Yellow, Yellow, Dark-Yellow    Appearance, Urine Turbid (N) Clear    Specific Gravity, Urine 1.031 1.005 - 1.035    pH, Urine 5.5 5.0, 5.5, 6.0, 6.5, 7.0, 7.5, 8.0    Protein, Urine 70 (1+) (A) NEGATIVE, 10 (TRACE), 20 (TRACE) mg/dL    Glucose, Urine 30 (TRACE) (A) Normal mg/dL    Blood, Urine 0.5 (2+) (A) NEGATIVE    Ketones, Urine NEGATIVE NEGATIVE mg/dL    Bilirubin, Urine NEGATIVE NEGATIVE    Urobilinogen, Urine Normal Normal mg/dL    Nitrite, Urine NEGATIVE NEGATIVE    Leukocyte Esterase, Urine 250 Mina/µL (A) NEGATIVE   Microscopic Only, Urine   Result Value Ref Range    WBC, Urine 21-50 (A) 1-5, NONE /HPF    RBC, Urine >20 (A) NONE, 1-2, 3-5 /HPF    Mucus, Urine FEW " Reference range not established. /LPF   CBC   Result Value Ref Range    WBC 28.9 (H) 4.4 - 11.3 x10*3/uL    nRBC 0.0 0.0 - 0.0 /100 WBCs    RBC 2.99 (L) 4.50 - 5.90 x10*6/uL    Hemoglobin 10.1 (L) 13.5 - 17.5 g/dL    Hematocrit 29.5 (L) 41.0 - 52.0 %    MCV 99 80 - 100 fL    MCH 33.8 26.0 - 34.0 pg    MCHC 34.2 32.0 - 36.0 g/dL    RDW 14.5 11.5 - 14.5 %    Platelets 103 (L) 150 - 450 x10*3/uL   Renal Function Panel   Result Value Ref Range    Glucose 150 (H) 74 - 99 mg/dL    Sodium 137 136 - 145 mmol/L    Potassium 3.8 3.5 - 5.3 mmol/L    Chloride 106 98 - 107 mmol/L    Bicarbonate 21 21 - 32 mmol/L    Anion Gap 14 10 - 20 mmol/L    Urea Nitrogen 55 (H) 6 - 23 mg/dL    Creatinine 2.00 (H) 0.50 - 1.30 mg/dL    eGFR 35 (L) >60 mL/min/1.73m*2    Calcium 6.9 (L) 8.6 - 10.3 mg/dL    Phosphorus 2.1 (L) 2.5 - 4.9 mg/dL    Albumin 2.7 (L) 3.4 - 5.0 g/dL   Magnesium   Result Value Ref Range    Magnesium 2.30 1.60 - 2.40 mg/dL           This patient has a central line   Reason for the central line remaining today? Hemodynamic monitoring      Assessment/Plan   Assessment & Plan  Obstruction of left ureteropelvic junction (UPJ) due to stone    Kidney stone  PMHx: Prostate CA s/p prostatectomy, parkinson's, kidney stones, elevated coronary artery calcium score, HTN, HLD, impaired fasting glucose.  Admitted to [facility] [place] with complaints of shivering, n/v, fever, and complaints of left flank pain.  Workup revealed septic shock with refractory hypotension despite 5 L of fluid requiring vasopressor support due to obstructive left renal stone in the renal pelvis with hydronephrosis who is now s/p placement 10F left nephrostomy tube in ER.    Neuro:    Hx parkinsons  - Neuro and pain assessment per unit protocol  - Sleep/wake cycle hygiene   - Delirium precaution   - As needed tylenol for acute pain  - Tylenol for fever  - Home sinemet  - PT/OT    CV:    Hx  HLD HTN   With Art-line & CVL  Septic shock due to obstructive UTI due to renal  stone.   - Hold HTN medication   - Weaned off vasopressors, MAP goal >65mmhg    - Continue stress dose steroids- will start to wean today    PULM:    - currently on 5L 02, given IS today, will have RT work with patient   -supplemental oxygen to keep spo2 > 92%     GI:??    - Regular diet  - on bowel regimen  - Pepcid    Renal:   Acute Renal failure, creatinine now downtrending  Left hydronephrosis   Hyperlactacidemia    Now status post left nephrostomy tube, draining clear yellow urine  - will need to follow up with urology as outpt for renal stone removal  - Continue ring for hourly I&Os  - Daily weights   - Daily RFP and replete electrolytes as needed    Heme  No s/s of acute bleeding  - SCDs and heparin subcutaneous for DVTppx  - Daily CBC    ID:??  Septic shock 2/2 renal obstruction and left hydronephrosis   BC + Klebsiella pneumonia/variicola  UC suspected contamination  - changed abx to ceftriaxone based on sensitivities   - Trend WBCs      Code; Full.        Toshia See MD

## 2024-09-14 NOTE — ASSESSMENT & PLAN NOTE
PMHx: Prostate CA s/p prostatectomy, parkinson's, kidney stones, elevated coronary artery calcium score, HTN, HLD, impaired fasting glucose.  Admitted to [facility] [place] with complaints of shivering, n/v, fever, and complaints of left flank pain.  Workup revealed septic shock with refractory hypotension despite 5 L of fluid requiring vasopressor support due to obstructive left renal stone in the renal pelvis with hydronephrosis who is now s/p placement 10F left nephrostomy tube in ER.    Neuro:    Hx parkinsons  - Neuro and pain assessment per unit protocol  - Sleep/wake cycle hygiene   - Delirium precaution   - As needed tylenol for acute pain  - Tylenol for fever  - Home sinemet  - PT/OT    CV:    Hx  HLD HTN   With Art-line & CVL  Septic shock due to obstructive UTI due to renal stone.   - Hold HTN medication   - Weaned off vasopressors, MAP goal >65mmhg    - Continue stress dose steroids- will start to wean today    PULM:    - currently on 5L 02, given IS today, will have RT work with patient   -supplemental oxygen to keep spo2 > 92%     GI:??    - Regular diet  - on bowel regimen  - Pepcid    Renal:   Acute Renal failure, creatinine now downtrending  Left hydronephrosis   Hyperlactacidemia    Now status post left nephrostomy tube, draining clear yellow urine  - will need to follow up with urology as outpt for renal stone removal  - Continue ring for hourly I&Os  - Daily weights   - Daily RFP and replete electrolytes as needed    Heme  No s/s of acute bleeding  - SCDs and heparin subcutaneous for DVTppx  - Daily CBC    ID:??  Septic shock 2/2 renal obstruction and left hydronephrosis   BC + Klebsiella pneumonia/variicola  UC suspected contamination  - changed abx to ceftriaxone based on sensitivities   - Trend WBCs      Code; Full.

## 2024-09-14 NOTE — PROGRESS NOTES
Physical Therapy    Physical Therapy Treatment    Patient Name: Quinton Kennedy  MRN: [id number]  Department: [place] [address] ICU  Room: [address] [place] [address]  Today's Date: 9/14/2024  Time Calculation  Start Time: 1212  Stop Time: 1238  Time Calculation (min): 26 min         Assessment/Plan   PT Assessment  PT Assessment Results: Decreased endurance, Decreased mobility, Impaired balance, Decreased safety awareness, Pain  Rehab Prognosis: Excellent  End of Session Communication: Bedside nurse  Assessment Comment: Patient is cooporative and participates well in session.  Focus is on improving endurance and strength with patient performing is therapeutic exercises, transfers and standing trials x 2.  End of Session Patient Position: Bed, 3 rail up  PT Plan  Inpatient/Swing Bed or Outpatient: Inpatient  PT Plan  Treatment/Interventions: Bed mobility, Transfer training, Gait training, Balance training, Strengthening, Endurance training, Range of motion, Therapeutic exercise, Therapeutic activity, Home exercise program  PT Plan: Ongoing PT  PT Frequency: 4 times per week  PT Discharge Recommendations: Low intensity level of continued care  Equipment Recommended upon Discharge: Wheeled walker  PT Recommended Transfer Status: Assist x2 (min assist of 2 2/2 line management)  PT - OK to Discharge: Yes (Per POC)      General Visit Information:   PT  Visit  PT Received On: 09/14/24  Response to Previous Treatment: Patient with no complaints from previous session.  General  Reason for Referral: 67 y/o M to ED with abdominal pain, vomiting, shivers, tachycardia, and hypotension; admitted with urosepsis d/t L sided uretoplevic junction obstruction from a kidney stone. Underwent urgent cystoscopy for L nephrostomy tube placement in IR on 9/12/24.  Referred By: DENNIS Lemus  Family/Caregiver Present: Yes  Caregiver Feedback: Spouse is present and supportive  Prior to Session Communication: Bedside nurse  Patient Position Received: Bed, 3 rail up  General  Comment: Patient is alert and cooperative.  Nurse is present during standing activities.    Subjective   Precautions:  Precautions  Medical Precautions: Fall precautions            Objective   Pain:  Pain Assessment  Pain Assessment: 0-10  0-10 (Numeric) Pain Score: 0 - No pain  Cognition:  Cognition  Overall Cognitive Status: Within Functional Limits  Orientation Level: Oriented X4  Coordination:  Movements are Fluid and Coordinated: Yes  Postural Control:  Postural Control  Postural Control: Within Functional Limits  Static Sitting Balance  Static Sitting-Balance Support: Bilateral upper extremity supported, Feet supported  Static Sitting-Level of Assistance: Distant supervision  Static Sitting-Comment/Number of Minutes: 8-minutes  Dynamic Sitting Balance  Dynamic Sitting-Balance Support: Bilateral upper extremity supported, Feet supported  Dynamic Sitting-Level of Assistance: Close supervision  Dynamic Sitting-Balance: Forward lean, Trunk control activities  Dynamic Sitting-Comments: Leaning to move items on his table  Static Standing Balance  Static Standing-Balance Support: Bilateral upper extremity supported  Static Standing-Level of Assistance: Minimum assistance  Static Standing-Comment/Number of Minutes: Stood at edge of bed for 5-minute with use of FWW and minimal assistance x 1  Dynamic Standing Balance  Dynamic Standing-Balance Support: Bilateral upper extremity supported  Dynamic Standing-Level of Assistance: Minimum assistance  Dynamic Standing-Balance:  (Side stepping)  Dynamic Standing-Comments: Side steps x5 x 2  Extremity/Trunk Assessments:    Activity Tolerance:  Activity Tolerance  Endurance: Tolerates 30 min exercise with multiple rests  Activity Tolerance Comments: Patient fatigues but tolerates session well  Treatments:  Therapeutic Exercise  Therapeutic Exercise Performed: Yes  Therapeutic Exercise Activity 1: Patient is instructed in and performs supine bilateral lowr extremity ankle pumps,  quad sets, glute sets, heel slides, and abduction x 15reps.  Working to improve strength and endurance.    Therapeutic Activity  Therapeutic Activity Performed: Yes  Therapeutic Activity 1: Standing at edge of the bed x 5mins x 2         Bed Mobility  Bed Mobility: Yes  Bed Mobility 1  Bed Mobility 1: Supine to sitting  Level of Assistance 1: Contact guard  Bed Mobility Comments 1: Cues provided safe transfer with line management needed.  Bed Mobility 2  Bed Mobility  2: Sitting to supine  Level of Assistance 2: Minimum assistance  Bed Mobility Comments 2: Assistance needed wih LES    Ambulation/Gait Training  Ambulation/Gait Training Performed: Yes  Ambulation/Gait Training 1  Surface 1: Level tile  Device 1: Rolling walker  Gait Support Devices: Gait belt  Assistance 1: Minimum assistance  Quality of Gait 1: Decreased step length  Comments/Distance (ft) 1: 5 Lateral steps x 2  Transfers  Transfer: No    Stairs  Stairs: No         Outcome Measures:  [facility] Basic Mobility  Turning from your back to your side while in a flat bed without using bedrails: A little  Moving from lying on your back to sitting on the side of a flat bed without using bedrails: A little  Moving to and from bed to chair (including a wheelchair): A little  Standing up from a chair using your arms (e.g. wheelchair or bedside chair): A little  To walk in hospital room: A little  Climbing 3-5 steps with railing: A lot  Basic Mobility - Total Score: 17    Education Documentation  Precautions, taught by Bisi Barakat PTA at 9/14/2024  2:34 PM.  Learner: Family, Patient  Readiness: Acceptance  Method: Demonstration, Teach-back  Response: Verbalizes Understanding, Demonstrated Understanding    Body Mechanics, taught by Bisi Barakat PTA at 9/14/2024  2:34 PM.  Learner: Family, Patient  Readiness: Acceptance  Method: Demonstration, Teach-back  Response: Verbalizes Understanding, Demonstrated Understanding    Mobility Training, taught by Bisi Barakat PTA  at 9/14/2024  2:34 PM.  Learner: Family, Patient  Readiness: Acceptance  Method: Demonstration, Teach-back  Response: Verbalizes Understanding, Demonstrated Understanding    Education Comments  No comments found.        OP EDUCATION:  Outpatient Education  Individual(s) Educated: Patient, Spouse  Education Provided: Body Mechanics, Fall Risk, Home Exercise Program, Post-Op Precautions    Encounter Problems       Encounter Problems (Active)       PT Problem       Patient will be able to independently maintain dynamic standing balance during walking and reaching.       Start:  09/13/24    Expected End:  09/27/24            Patient will ambulate >150' on level surfaces using LRD or no device independently.       Start:  09/13/24    Expected End:  09/27/24            Patient will ascend and descend 1 flight of stairs with rail and supervision.       Start:  09/13/24    Expected End:  09/27/24            Patient will perform sit<->stand transfer from EOB and chair independently.       Start:  09/13/24    Expected End:  09/27/24            Patient will perform supine to sit on flat bed with no rails independently demonstrating control.       Start:  09/13/24    Expected End:  09/27/24            Patient will ascend and descend 2 steps without rail with stand by assist.       Start:  09/13/24    Expected End:  09/27/24                   Pain - Adult

## 2024-09-15 VITALS
WEIGHT: 217.15 LBS | RESPIRATION RATE: 19 BRPM | BODY MASS INDEX: 29.41 KG/M2 | TEMPERATURE: 98.5 F | DIASTOLIC BLOOD PRESSURE: 70 MMHG | HEART RATE: 72 BPM | HEIGHT: 72 IN | OXYGEN SATURATION: 94 % | SYSTOLIC BLOOD PRESSURE: 135 MMHG

## 2024-09-15 LAB
ALBUMIN SERPL BCP-MCNC: 2.6 G/DL (ref 3.4–5)
ANION GAP SERPL CALC-SCNC: 9 MMOL/L (ref 10–20)
BACTERIA UR CULT: NO GROWTH
BUN SERPL-MCNC: 44 MG/DL (ref 6–23)
CALCIUM SERPL-MCNC: 7.2 MG/DL (ref 8.6–10.3)
CHLORIDE SERPL-SCNC: 110 MMOL/L (ref 98–107)
CO2 SERPL-SCNC: 25 MMOL/L (ref 21–32)
CREAT SERPL-MCNC: 1.9 MG/DL (ref 0.5–1.3)
EGFRCR SERPLBLD CKD-EPI 2021: 38 ML/MIN/1.73M*2
ERYTHROCYTE [DISTWIDTH] IN BLOOD BY AUTOMATED COUNT: 14.6 % (ref 11.5–14.5)
GLUCOSE SERPL-MCNC: 139 MG/DL (ref 74–99)
HCT VFR BLD AUTO: 30.6 % (ref 41–52)
HGB BLD-MCNC: 10.7 G/DL (ref 13.5–17.5)
MCH RBC QN AUTO: 34.5 PG (ref 26–34)
MCHC RBC AUTO-ENTMCNC: 35 G/DL (ref 32–36)
MCV RBC AUTO: 99 FL (ref 80–100)
NRBC BLD-RTO: 0 /100 WBCS (ref 0–0)
PHOSPHATE SERPL-MCNC: 1.8 MG/DL (ref 2.5–4.9)
PLATELET # BLD AUTO: 104 X10*3/UL (ref 150–450)
POTASSIUM SERPL-SCNC: 3.9 MMOL/L (ref 3.5–5.3)
RBC # BLD AUTO: 3.1 X10*6/UL (ref 4.5–5.9)
SODIUM SERPL-SCNC: 140 MMOL/L (ref 136–145)
WBC # BLD AUTO: 27.3 X10*3/UL (ref 4.4–11.3)

## 2024-09-15 PROCEDURE — 2500000001 HC RX 250 WO HCPCS SELF ADMINISTERED DRUGS (ALT 637 FOR MEDICARE OP): Performed by: SURGERY

## 2024-09-15 PROCEDURE — 2500000004 HC RX 250 GENERAL PHARMACY W/ HCPCS (ALT 636 FOR OP/ED): Performed by: HOSPITALIST

## 2024-09-15 PROCEDURE — 37799 UNLISTED PX VASCULAR SURGERY: CPT | Performed by: NURSE PRACTITIONER

## 2024-09-15 PROCEDURE — 1100000001 HC PRIVATE ROOM DAILY

## 2024-09-15 PROCEDURE — 2500000001 HC RX 250 WO HCPCS SELF ADMINISTERED DRUGS (ALT 637 FOR MEDICARE OP): Performed by: NURSE PRACTITIONER

## 2024-09-15 PROCEDURE — 9420000001 HC RT PATIENT EDUCATION 5 MIN

## 2024-09-15 PROCEDURE — 97116 GAIT TRAINING THERAPY: CPT | Mod: CQ,59,GP

## 2024-09-15 PROCEDURE — 97530 THERAPEUTIC ACTIVITIES: CPT | Mod: CQ,GP

## 2024-09-15 PROCEDURE — 80069 RENAL FUNCTION PANEL: CPT | Performed by: NURSE PRACTITIONER

## 2024-09-15 PROCEDURE — 2500000001 HC RX 250 WO HCPCS SELF ADMINISTERED DRUGS (ALT 637 FOR MEDICARE OP): Performed by: HOSPITALIST

## 2024-09-15 PROCEDURE — 2500000004 HC RX 250 GENERAL PHARMACY W/ HCPCS (ALT 636 FOR OP/ED): Performed by: ANESTHESIOLOGY

## 2024-09-15 PROCEDURE — 2500000004 HC RX 250 GENERAL PHARMACY W/ HCPCS (ALT 636 FOR OP/ED): Performed by: SURGERY

## 2024-09-15 PROCEDURE — 2500000004 HC RX 250 GENERAL PHARMACY W/ HCPCS (ALT 636 FOR OP/ED): Performed by: NURSE PRACTITIONER

## 2024-09-15 PROCEDURE — 85027 COMPLETE CBC AUTOMATED: CPT | Performed by: NURSE PRACTITIONER

## 2024-09-15 PROCEDURE — 99291 CRITICAL CARE FIRST HOUR: CPT | Performed by: SURGERY

## 2024-09-15 RX ORDER — SODIUM,POTASSIUM PHOSPHATES 280-250MG
1 POWDER IN PACKET (EA) ORAL 4 TIMES DAILY
Status: COMPLETED | OUTPATIENT
Start: 2024-09-15 | End: 2024-09-15

## 2024-09-15 RX ORDER — FUROSEMIDE 10 MG/ML
20 INJECTION INTRAMUSCULAR; INTRAVENOUS ONCE
Status: COMPLETED | OUTPATIENT
Start: 2024-09-15 | End: 2024-09-15

## 2024-09-15 RX ORDER — IPRATROPIUM BROMIDE AND ALBUTEROL SULFATE 2.5; .5 MG/3ML; MG/3ML
3 SOLUTION RESPIRATORY (INHALATION) EVERY 2 HOUR PRN
Status: DISCONTINUED | OUTPATIENT
Start: 2024-09-15 | End: 2024-09-17 | Stop reason: HOSPADM

## 2024-09-15 RX ORDER — POTASSIUM CHLORIDE 14.9 MG/ML
INJECTION INTRAVENOUS
Status: DISPENSED
Start: 2024-09-15 | End: 2024-09-15

## 2024-09-15 RX ADMIN — ASPIRIN 81 MG: 81 TABLET, COATED ORAL at 09:51

## 2024-09-15 RX ADMIN — FUROSEMIDE 20 MG: 10 INJECTION, SOLUTION INTRAMUSCULAR; INTRAVENOUS at 09:50

## 2024-09-15 RX ADMIN — CARBIDOPA AND LEVODOPA 1.5 TABLET: 25; 100 TABLET ORAL at 07:24

## 2024-09-15 RX ADMIN — POTASSIUM & SODIUM PHOSPHATES POWDER PACK 280-160-250 MG 1 PACKET: 280-160-250 PACK at 08:00

## 2024-09-15 RX ADMIN — HEPARIN SODIUM 5000 UNITS: 5000 INJECTION INTRAVENOUS; SUBCUTANEOUS at 15:02

## 2024-09-15 RX ADMIN — POTASSIUM & SODIUM PHOSPHATES POWDER PACK 280-160-250 MG 1 PACKET: 280-160-250 PACK at 13:25

## 2024-09-15 RX ADMIN — FAMOTIDINE 20 MG: 20 TABLET, FILM COATED ORAL at 09:51

## 2024-09-15 RX ADMIN — TRAZODONE HYDROCHLORIDE 100 MG: 50 TABLET ORAL at 23:31

## 2024-09-15 RX ADMIN — CEFTRIAXONE 2 G: 2 INJECTION, POWDER, FOR SOLUTION INTRAMUSCULAR; INTRAVENOUS at 04:02

## 2024-09-15 RX ADMIN — CARBIDOPA AND LEVODOPA 1.5 TABLET: 25; 100 TABLET ORAL at 19:19

## 2024-09-15 RX ADMIN — CARBIDOPA AND LEVODOPA 1.5 TABLET: 25; 100 TABLET ORAL at 23:28

## 2024-09-15 RX ADMIN — CARBIDOPA AND LEVODOPA 1.5 TABLET: 25; 100 TABLET ORAL at 13:25

## 2024-09-15 RX ADMIN — HYDROCORTISONE SODIUM SUCCINATE 50 MG: 100 INJECTION, POWDER, FOR SOLUTION INTRAMUSCULAR; INTRAVENOUS at 07:24

## 2024-09-15 RX ADMIN — HEPARIN SODIUM 5000 UNITS: 5000 INJECTION INTRAVENOUS; SUBCUTANEOUS at 23:32

## 2024-09-15 RX ADMIN — HEPARIN SODIUM 5000 UNITS: 5000 INJECTION INTRAVENOUS; SUBCUTANEOUS at 07:24

## 2024-09-15 RX ADMIN — ATORVASTATIN CALCIUM 40 MG: 40 TABLET, FILM COATED ORAL at 23:28

## 2024-09-15 ASSESSMENT — COGNITIVE AND FUNCTIONAL STATUS - GENERAL
STANDING UP FROM CHAIR USING ARMS: A LITTLE
MOVING FROM LYING ON BACK TO SITTING ON SIDE OF FLAT BED WITH BEDRAILS: A LITTLE
PERSONAL GROOMING: A LITTLE
WALKING IN HOSPITAL ROOM: A LITTLE
CLIMB 3 TO 5 STEPS WITH RAILING: A LOT
DAILY ACTIVITIY SCORE: 19
TOILETING: A LITTLE
PERSONAL GROOMING: A LITTLE
WALKING IN HOSPITAL ROOM: A LOT
DRESSING REGULAR LOWER BODY CLOTHING: A LITTLE
MOVING TO AND FROM BED TO CHAIR: A LITTLE
TOILETING: A LITTLE
HELP NEEDED FOR BATHING: A LITTLE
DRESSING REGULAR UPPER BODY CLOTHING: A LITTLE
MOBILITY SCORE: 16
MOBILITY SCORE: 17
WALKING IN HOSPITAL ROOM: A LOT
TURNING FROM BACK TO SIDE WHILE IN FLAT BAD: A LITTLE
MOVING FROM LYING ON BACK TO SITTING ON SIDE OF FLAT BED WITH BEDRAILS: A LITTLE
CLIMB 3 TO 5 STEPS WITH RAILING: A LOT
DAILY ACTIVITIY SCORE: 19
MOVING TO AND FROM BED TO CHAIR: A LITTLE
CLIMB 3 TO 5 STEPS WITH RAILING: A LOT
STANDING UP FROM CHAIR USING ARMS: A LITTLE
HELP NEEDED FOR BATHING: A LITTLE
TURNING FROM BACK TO SIDE WHILE IN FLAT BAD: A LITTLE
MOVING FROM LYING ON BACK TO SITTING ON SIDE OF FLAT BED WITH BEDRAILS: A LITTLE
MOVING TO AND FROM BED TO CHAIR: A LITTLE
MOBILITY SCORE: 16
STANDING UP FROM CHAIR USING ARMS: A LITTLE
DRESSING REGULAR UPPER BODY CLOTHING: A LITTLE
DRESSING REGULAR LOWER BODY CLOTHING: A LITTLE
TURNING FROM BACK TO SIDE WHILE IN FLAT BAD: A LITTLE

## 2024-09-15 ASSESSMENT — PAIN - FUNCTIONAL ASSESSMENT
PAIN_FUNCTIONAL_ASSESSMENT: 0-10

## 2024-09-15 ASSESSMENT — PAIN SCALES - GENERAL
PAINLEVEL_OUTOF10: 0 - NO PAIN

## 2024-09-15 NOTE — ASSESSMENT & PLAN NOTE
PMHx: Prostate CA s/p prostatectomy, parkinson's, kidney stones, elevated coronary artery calcium score, HTN, HLD, impaired fasting glucose.  [place] [facility] [place] of shivering, n/v, fever, and complaints of left flank pain.  Workup revealed septic shock with refractory hypotension despite 5 L of fluid requiring vasopressor support due to obstructive left renal stone in the renal pelvis with hydronephrosis who is now s/p placement 10F left nephrostomy tube in ER.    Neuro:    Hx parkinsons  - Neuro and pain assessment per unit protocol  - Sleep/wake cycle hygiene --changed home trazodone to 100mg. Hoping RNF is quieter and allows full nights sleep  - Delirium precaution   - As needed tylenol for acute pain  - Home sinemet  - PT/OT - stood up at bedside with walker yesterday, per wife pt has trouble with standing and sitting due to balance issues from parkinson's but no limitations otherwise    CV:    Hx  HLD HTN   Septic shock due to obstructive UTI due to renal stone.   - Hold HTN medication   - Weaned off vasopressors yesterday  - Stress dose steroids stopped today    PULM:    - oxygen weaned off the last 24hrs   -cont IS work      GI:??    - Regular diet  - on bowel regimen  - Pepcid    Renal:   Acute Renal failure, creatinine now downtrending  Left hydronephrosis   Now status post left nephrostomy tube, draining clear yellow urine  - will need to follow up with urology as outpt for renal stone removal (has  at [facility]  - Continue ring for hourly I&Os --would plan to remove 9/16  - Daily weights   - Daily RFP and replete electrolytes as needed    Heme  No s/s of acute bleeding  - SCDs and heparin subcutaneous for DVTppx  - Daily CBC    ID:??  Septic shock 2/2 renal obstruction and left hydronephrosis   BC + Klebsiella pneumonia/variicola  - changed abx to ceftriaxone based on sensitivities   - Trend WBCs  -may consider ID consult to determine optimal length of abx and switch to PO med upon  discharge    Code; Full.

## 2024-09-15 NOTE — PROGRESS NOTES
Physical Therapy    Physical Therapy Treatment    Patient Name: Quinton Kennedy  MRN: [id number]  Department: [place] [address] ICU  Room: [address] [place] [address]  Today's Date: 9/15/2024  Time Calculation  Start Time: 1050  Stop Time: 1120  Time Calculation (min): 30 min         Assessment/Plan   PT Assessment  End of Session Communication: Bedside nurse  Assessment Comment: Pt tolerated treatment and participates well in therapy.  End of Session Patient Position: Up in chair, Alarm on  PT Plan  Inpatient/Swing Bed or Outpatient: Inpatient  PT Plan  Treatment/Interventions: Bed mobility, Transfer training, Gait training, Balance training, Strengthening, Endurance training, Range of motion, Therapeutic exercise, Therapeutic activity, Home exercise program  PT Plan: Ongoing PT  PT Frequency: 4 times per week  PT Discharge Recommendations: Low intensity level of continued care  Equipment Recommended upon Discharge: Wheeled walker  PT Recommended Transfer Status: Assist x2 (min assist of 2 2/2 line management)  PT - OK to Discharge: Yes (Per PT POC)      General Visit Information:   PT  Visit  PT Received On: 09/15/24  General  Reason for Referral: 69 y/o M to ED with abdominal pain, vomiting, shivers, tachycardia, and hypotension; admitted with urosepsis d/t L sided uretoplevic junction obstruction from a kidney stone. Underwent urgent cystoscopy for L nephrostomy tube placement in IR on 9/12/24.  Referred By: DENNIS Lemus  Family/Caregiver Present: Yes  Caregiver Feedback: Spouse is present and supportive  Prior to Session Communication: Bedside nurse  Patient Position Received: Bed, 4 rail up  General Comment: Pt semi-supine in bed upon arrival. Alert and motivated.    Subjective   Precautions:       Vitals:  /90  SpO2 97%    Objective   Pain:  Pain Assessment  Pain Assessment: 0-10  0-10 (Numeric) Pain Score: 0 - No pain  Cognition:  Cognition  Orientation Level: Oriented X4  Coordination:     Postural Control:       Treatments:  Bed  Mobility  Bed Mobility: Yes  Bed Mobility 1  Bed Mobility 1: Supine to sitting  Level of Assistance 1: Contact guard  Bed Mobility Comments 1: Cues to use bed rail and EOB as needed.    Ambulation/Gait Training  Ambulation/Gait Training Performed: Yes  Ambulation/Gait Training 1  Surface 1: Level tile  Device 1: Rolling walker  Gait Support Devices: Gait belt  Assistance 1: Contact guard  Quality of Gait 1: Decreased step length, Diminished heel strike, Forward flexed posture (Cues to facilitate upright posture.)  Comments/Distance (ft) 1: 30ft  Transfer 1  Transfer From 1: Bed to, Stand to  Transfer to 1: Stand, Chair with arms  Technique 1: Sit to stand, Stand to sit  Transfer Device 1: Gait belt  Transfer Level of Assistance 1: Contact guard  Trials/Comments 1: Cues for sequencing and walker safety.    Outcome Measures:  [facility] Basic Mobility  Turning from your back to your side while in a flat bed without using bedrails: A little  Moving from lying on your back to sitting on the side of a flat bed without using bedrails: A little  Moving to and from bed to chair (including a wheelchair): A little  Standing up from a chair using your arms (e.g. wheelchair or bedside chair): A little  To walk in hospital room: A little  Climbing 3-5 steps with railing: A lot  Basic Mobility - Total Score: 17    Education Documentation  Handouts, taught by Cherry Saini PTA at 9/15/2024 12:39 PM.  Learner: Patient  Readiness: Acceptance  Method: Explanation  Response: Verbalizes Understanding    Body Mechanics, taught by Cherry Saini PTA at 9/15/2024 12:39 PM.  Learner: Patient  Readiness: Acceptance  Method: Explanation  Response: Verbalizes Understanding    Precautions, taught by Cherry Saini PTA at 9/15/2024 12:39 PM.  Learner: Patient  Readiness: Acceptance  Method: Explanation  Response: Verbalizes Understanding    Home Exercise Program, taught by Cherry Saini PTA at 9/15/2024 12:39 PM.  Learner:  Patient  Readiness: Acceptance  Method: Explanation  Response: Verbalizes Understanding    ADL Training, taught by Cherry Saini PTA at 9/15/2024 12:39 PM.  Learner: Patient  Readiness: Acceptance  Method: Explanation  Response: Verbalizes Understanding    Precautions, taught by Cherry Saini PTA at 9/15/2024 12:39 PM.  Learner: Patient  Readiness: Acceptance  Method: Explanation  Response: Verbalizes Understanding    Body Mechanics, taught by Cherry Saini PTA at 9/15/2024 12:39 PM.  Learner: Patient  Readiness: Acceptance  Method: Explanation  Response: Verbalizes Understanding    Mobility Training, taught by Cherry Saini PTA at 9/15/2024 12:39 PM.  Learner: Patient  Readiness: Acceptance  Method: Explanation  Response: Verbalizes Understanding    Education Comments  No comments found.        OP EDUCATION:       Encounter Problems       Encounter Problems (Active)       PT Problem       Patient will be able to independently maintain dynamic standing balance during walking and reaching. (Progressing)       Start:  09/13/24    Expected End:  09/27/24            Patient will ambulate >150' on level surfaces using LRD or no device independently. (Progressing)       Start:  09/13/24    Expected End:  09/27/24            Patient will ascend and descend 1 flight of stairs with rail and supervision. (Not Progressing)       Start:  09/13/24    Expected End:  09/27/24            Patient will perform sit<->stand transfer from EOB and chair independently. (Progressing)       Start:  09/13/24    Expected End:  09/27/24            Patient will perform supine to sit on flat bed with no rails independently demonstrating control. (Progressing)       Start:  09/13/24    Expected End:  09/27/24            Patient will ascend and descend 2 steps without rail with stand by assist. (Not Progressing)       Start:  09/13/24    Expected End:  09/27/24                   Pain - Adult

## 2024-09-15 NOTE — PROGRESS NOTES
"Quinton Kennedy is a 68 y.o. male on day 3 of admission presenting with Obstruction of left ureteropelvic junction (UPJ) due to stone.    Subjective   Continues to look clinically better every day.  Did very well with lasix dose yesterday, net negative 3L.  Giving small dose again today. Oxygen requirements down from 5L to RA today, lungs clear. Urine clear from ring and perc neph drain.   Objective     PHYSICAL EXAM    Awake and alert  Color improved  Breathing comfortably, no wheeze  Abd soft, tolerating regular diet  Ext warm, decreased non-pitting edema today  Urine clear in ring bag and nephrostomy tube    Last Recorded Vitals  Blood pressure 118/72, pulse 73, temperature 36.5 °C (97.7 °F), temperature source Temporal, resp. rate 21, height 1.829 m (6' 0.01\"), weight 98.5 kg (217 lb 2.5 oz), SpO2 92%.  Intake/Output last 3 Shifts:  I/O last 3 completed shifts:  In: 540 (5.5 mL/kg) [P.O.:240; IV Piggyback:300]  Out: 3930 (39.9 mL/kg) [Urine:3930 (1.1 mL/kg/hr)]  Weight: 98.5 kg     Relevant Results  Results for orders placed or performed during the hospital encounter of 09/12/24 (from the past 24 hour(s))   CBC   Result Value Ref Range    WBC 27.3 (H) 4.4 - 11.3 x10*3/uL    nRBC 0.0 0.0 - 0.0 /100 WBCs    RBC 3.10 (L) 4.50 - 5.90 x10*6/uL    Hemoglobin 10.7 (L) 13.5 - 17.5 g/dL    Hematocrit 30.6 (L) 41.0 - 52.0 %    MCV 99 80 - 100 fL    MCH 34.5 (H) 26.0 - 34.0 pg    MCHC 35.0 32.0 - 36.0 g/dL    RDW 14.6 (H) 11.5 - 14.5 %    Platelets 104 (L) 150 - 450 x10*3/uL   Renal Function Panel   Result Value Ref Range    Glucose 139 (H) 74 - 99 mg/dL    Sodium 140 136 - 145 mmol/L    Potassium 3.9 3.5 - 5.3 mmol/L    Chloride 110 (H) 98 - 107 mmol/L    Bicarbonate 25 21 - 32 mmol/L    Anion Gap 9 (L) 10 - 20 mmol/L    Urea Nitrogen 44 (H) 6 - 23 mg/dL    Creatinine 1.90 (H) 0.50 - 1.30 mg/dL    eGFR 38 (L) >60 mL/min/1.73m*2    Calcium 7.2 (L) 8.6 - 10.3 mg/dL    Phosphorus 1.8 (L) 2.5 - 4.9 mg/dL    Albumin 2.6 (L) " 3.4 - 5.0 g/dL               Assessment/Plan   Assessment & Plan  Obstruction of left ureteropelvic junction (UPJ) due to stone    Kidney stone  PMHx: Prostate CA s/p prostatectomy, parkinson's, kidney stones, elevated coronary artery calcium score, HTN, HLD, impaired fasting glucose.  Admitted to [facility] [place] with complaints of shivering, n/v, fever, and complaints of left flank pain.  Workup revealed septic shock with refractory hypotension despite 5 L of fluid requiring vasopressor support due to obstructive left renal stone in the renal pelvis with hydronephrosis who is now s/p placement 10F left nephrostomy tube in ER.    Neuro:    Hx parkinsons  - Neuro and pain assessment per unit protocol  - Sleep/wake cycle hygiene --changed home trazodone to 100mg. Hoping RNF is quieter and allows full nights sleep  - Delirium precaution   - As needed tylenol for acute pain  - Home sinemet  - PT/OT - stood up at bedside with walker yesterday, per wife pt has trouble with standing and sitting due to balance issues from parkinson's but no limitations otherwise    CV:    Hx  HLD HTN   Septic shock due to obstructive UTI due to renal stone.   - Hold HTN medication   - Weaned off vasopressors yesterday  - Stress dose steroids stopped today    PULM:    - oxygen weaned off the last 24hrs   -cont IS work      GI:??    - Regular diet  - on bowel regimen  - Pepcid    Renal:   Acute Renal failure, creatinine now downtrending  Left hydronephrosis   Now status post left nephrostomy tube, draining clear yellow urine  - will need to follow up with urology as outpt for renal stone removal (has dr at [place]  - Continue ring for hourly I&Os --would plan to remove 9/16  - Daily weights   - Daily RFP and replete electrolytes as needed    Heme  No s/s of acute bleeding  - SCDs and heparin subcutaneous for DVTppx  - Daily CBC    ID:??  Septic shock 2/2 renal obstruction and left hydronephrosis   BC + Klebsiella pneumonia/variicola  - changed  abx to ceftriaxone based on sensitivities   - Trend WBCs  -may consider ID consult to determine optimal length of abx and switch to PO med upon discharge    Code; Full.      Updated wife at bedside.    Dispo: stable for RNF.    Toshia See MD

## 2024-09-16 LAB
ANION GAP SERPL CALC-SCNC: 12 MMOL/L (ref 10–20)
BUN SERPL-MCNC: 35 MG/DL (ref 6–23)
CALCIUM SERPL-MCNC: 7.3 MG/DL (ref 8.6–10.3)
CHLORIDE SERPL-SCNC: 113 MMOL/L (ref 98–107)
CO2 SERPL-SCNC: 21 MMOL/L (ref 21–32)
CREAT SERPL-MCNC: 1.38 MG/DL (ref 0.5–1.3)
EGFRCR SERPLBLD CKD-EPI 2021: 55 ML/MIN/1.73M*2
ERYTHROCYTE [DISTWIDTH] IN BLOOD BY AUTOMATED COUNT: 14.6 % (ref 11.5–14.5)
GLUCOSE SERPL-MCNC: 125 MG/DL (ref 74–99)
HCT VFR BLD AUTO: 34.9 % (ref 41–52)
HGB BLD-MCNC: 11.7 G/DL (ref 13.5–17.5)
LACTATE SERPL-SCNC: 1.4 MMOL/L (ref 0.4–2)
MCH RBC QN AUTO: 34.2 PG (ref 26–34)
MCHC RBC AUTO-ENTMCNC: 33.5 G/DL (ref 32–36)
MCV RBC AUTO: 102 FL (ref 80–100)
NRBC BLD-RTO: 0.2 /100 WBCS (ref 0–0)
PLATELET # BLD AUTO: 109 X10*3/UL (ref 150–450)
POTASSIUM SERPL-SCNC: 3.9 MMOL/L (ref 3.5–5.3)
RBC # BLD AUTO: 3.42 X10*6/UL (ref 4.5–5.9)
SODIUM SERPL-SCNC: 142 MMOL/L (ref 136–145)
WBC # BLD AUTO: 17 X10*3/UL (ref 4.4–11.3)

## 2024-09-16 PROCEDURE — 2500000004 HC RX 250 GENERAL PHARMACY W/ HCPCS (ALT 636 FOR OP/ED): Performed by: ANESTHESIOLOGY

## 2024-09-16 PROCEDURE — 36415 COLL VENOUS BLD VENIPUNCTURE: CPT | Performed by: INTERNAL MEDICINE

## 2024-09-16 PROCEDURE — 80048 BASIC METABOLIC PNL TOTAL CA: CPT | Performed by: INTERNAL MEDICINE

## 2024-09-16 PROCEDURE — 1100000001 HC PRIVATE ROOM DAILY

## 2024-09-16 PROCEDURE — 97116 GAIT TRAINING THERAPY: CPT | Mod: GP,CQ

## 2024-09-16 PROCEDURE — 2500000004 HC RX 250 GENERAL PHARMACY W/ HCPCS (ALT 636 FOR OP/ED): Performed by: HOSPITALIST

## 2024-09-16 PROCEDURE — 97530 THERAPEUTIC ACTIVITIES: CPT | Mod: GO,CO

## 2024-09-16 PROCEDURE — 9420000001 HC RT PATIENT EDUCATION 5 MIN

## 2024-09-16 PROCEDURE — 99233 SBSQ HOSP IP/OBS HIGH 50: CPT | Performed by: INTERNAL MEDICINE

## 2024-09-16 PROCEDURE — 97535 SELF CARE MNGMENT TRAINING: CPT | Mod: GO,CO

## 2024-09-16 PROCEDURE — 97530 THERAPEUTIC ACTIVITIES: CPT | Mod: GP,CQ

## 2024-09-16 PROCEDURE — 83605 ASSAY OF LACTIC ACID: CPT | Performed by: INTERNAL MEDICINE

## 2024-09-16 PROCEDURE — 2500000001 HC RX 250 WO HCPCS SELF ADMINISTERED DRUGS (ALT 637 FOR MEDICARE OP): Performed by: HOSPITALIST

## 2024-09-16 PROCEDURE — 85027 COMPLETE CBC AUTOMATED: CPT | Performed by: INTERNAL MEDICINE

## 2024-09-16 PROCEDURE — 99232 SBSQ HOSP IP/OBS MODERATE 35: CPT

## 2024-09-16 RX ADMIN — CARBIDOPA AND LEVODOPA 1.5 TABLET: 25; 100 TABLET ORAL at 13:06

## 2024-09-16 RX ADMIN — ATORVASTATIN CALCIUM 40 MG: 40 TABLET, FILM COATED ORAL at 21:13

## 2024-09-16 RX ADMIN — HEPARIN SODIUM 5000 UNITS: 5000 INJECTION INTRAVENOUS; SUBCUTANEOUS at 06:33

## 2024-09-16 RX ADMIN — HEPARIN SODIUM 5000 UNITS: 5000 INJECTION INTRAVENOUS; SUBCUTANEOUS at 16:33

## 2024-09-16 RX ADMIN — CARBIDOPA AND LEVODOPA 1.5 TABLET: 25; 100 TABLET ORAL at 21:13

## 2024-09-16 RX ADMIN — FAMOTIDINE 20 MG: 20 TABLET, FILM COATED ORAL at 09:55

## 2024-09-16 RX ADMIN — CARBIDOPA AND LEVODOPA 1.5 TABLET: 25; 100 TABLET ORAL at 19:06

## 2024-09-16 RX ADMIN — CARBIDOPA AND LEVODOPA 1.5 TABLET: 25; 100 TABLET ORAL at 06:33

## 2024-09-16 RX ADMIN — CEFTRIAXONE 2 G: 2 INJECTION, POWDER, FOR SOLUTION INTRAMUSCULAR; INTRAVENOUS at 06:35

## 2024-09-16 RX ADMIN — HEPARIN SODIUM 5000 UNITS: 5000 INJECTION INTRAVENOUS; SUBCUTANEOUS at 21:18

## 2024-09-16 RX ADMIN — ASPIRIN 81 MG: 81 TABLET, COATED ORAL at 09:55

## 2024-09-16 RX ADMIN — TRAZODONE HYDROCHLORIDE 100 MG: 50 TABLET ORAL at 21:17

## 2024-09-16 ASSESSMENT — PAIN SCALES - GENERAL
PAINLEVEL_OUTOF10: 0 - NO PAIN

## 2024-09-16 ASSESSMENT — COGNITIVE AND FUNCTIONAL STATUS - GENERAL
DRESSING REGULAR UPPER BODY CLOTHING: A LITTLE
DAILY ACTIVITIY SCORE: 19
MOVING TO AND FROM BED TO CHAIR: A LITTLE
STANDING UP FROM CHAIR USING ARMS: A LITTLE
TOILETING: A LITTLE
HELP NEEDED FOR BATHING: A LITTLE
DRESSING REGULAR UPPER BODY CLOTHING: A LITTLE
PERSONAL GROOMING: A LITTLE
MOBILITY SCORE: 16
WALKING IN HOSPITAL ROOM: A LITTLE
CLIMB 3 TO 5 STEPS WITH RAILING: A LOT
STANDING UP FROM CHAIR USING ARMS: A LITTLE
MOVING TO AND FROM BED TO CHAIR: A LITTLE
DAILY ACTIVITIY SCORE: 19
MOVING FROM LYING ON BACK TO SITTING ON SIDE OF FLAT BED WITH BEDRAILS: A LITTLE
DRESSING REGULAR LOWER BODY CLOTHING: A LITTLE
STANDING UP FROM CHAIR USING ARMS: A LITTLE
TURNING FROM BACK TO SIDE WHILE IN FLAT BAD: A LITTLE
MOBILITY SCORE: 16
DRESSING REGULAR UPPER BODY CLOTHING: A LITTLE
DAILY ACTIVITIY SCORE: 19
HELP NEEDED FOR BATHING: A LOT
TOILETING: A LITTLE
PERSONAL GROOMING: A LITTLE
DRESSING REGULAR LOWER BODY CLOTHING: A LITTLE
TURNING FROM BACK TO SIDE WHILE IN FLAT BAD: A LITTLE
PERSONAL GROOMING: A LITTLE
TOILETING: A LITTLE
MOVING TO AND FROM BED TO CHAIR: A LITTLE
TURNING FROM BACK TO SIDE WHILE IN FLAT BAD: A LITTLE
MOBILITY SCORE: 18
WALKING IN HOSPITAL ROOM: A LOT
CLIMB 3 TO 5 STEPS WITH RAILING: A LITTLE
MOVING FROM LYING ON BACK TO SITTING ON SIDE OF FLAT BED WITH BEDRAILS: A LITTLE
WALKING IN HOSPITAL ROOM: A LOT
MOVING FROM LYING ON BACK TO SITTING ON SIDE OF FLAT BED WITH BEDRAILS: A LITTLE
HELP NEEDED FOR BATHING: A LITTLE
CLIMB 3 TO 5 STEPS WITH RAILING: A LOT

## 2024-09-16 ASSESSMENT — PAIN - FUNCTIONAL ASSESSMENT
PAIN_FUNCTIONAL_ASSESSMENT: 0-10

## 2024-09-16 ASSESSMENT — ACTIVITIES OF DAILY LIVING (ADL): HOME_MANAGEMENT_TIME_ENTRY: 18

## 2024-09-16 NOTE — PROGRESS NOTES
Quinton Kennedy is a 68 y.o. male on day 4 of admission presenting with Obstruction of left ureteropelvic junction (UPJ) due to stone.      Subjective   Pt seen this morning, on room air, feels weak overall, indwelling ring in place, feels ok, no specific complaints. No overnight events reported. Afebrile.        Objective     Last Recorded Vitals  /73 (BP Location: Right arm, Patient Position: Sitting)   Pulse 75   Temp 36.7 °C (98 °F) (Temporal)   Resp 17   Wt 98.5 kg (217 lb 2.5 oz)   SpO2 95%   Intake/Output last 3 Shifts:    Intake/Output Summary (Last 24 hours) at 9/16/2024 1742  Last data filed at 9/16/2024 1241  Gross per 24 hour   Intake 360 ml   Output 1350 ml   Net -990 ml       Admission Weight  Weight: 90.7 kg (200 lb) (09/12/24 0014)    Daily Weight  09/15/24 : 98.5 kg (217 lb 2.5 oz)    Image Results  ECG 12 lead  Sinus tachycardia  Left anterior fascicular block  Left ventricular hypertrophy with repolarization abnormality ( R in aVL )  Cannot rule out Septal infarct , age undetermined  Abnormal ECG  When compared with ECG of 12-NOV-2018 15:42,  Vent. rate has increased BY  80 BPM  Minimal criteria for Septal infarct are now Present  ST no longer elevated in Lateral leads  T wave inversion no longer evident in Inferior leads  See ED provider note for full interpretation and clinical correlation  Confirmed by Aysha Moreno ([id number]) on 9/14/2024 1:16:50 PM      Physical Exam  Constitutional:       Appearance: Normal appearance.   Cardiovascular:      Rate and Rhythm: Normal rate and regular rhythm.   Pulmonary:      Effort: Pulmonary effort is normal.      Breath sounds: Normal breath sounds.   Abdominal:      General: Abdomen is flat. Bowel sounds are normal.      Palpations: Abdomen is soft.   Musculoskeletal:      Cervical back: Normal range of motion.   Skin:     General: Skin is warm.   Neurological:      General: No focal deficit present.      Mental Status: He is alert and oriented to  person, place, and time. Mental status is at baseline.   Psychiatric:         Mood and Affect: Mood normal.         Behavior: Behavior normal.         Thought Content: Thought content normal.         Judgment: Judgment normal.         This patient has a urinary catheter   Reason for the urinary catheter remaining today? Urine catheter unnecessary, will be removed today          Assessment & Plan  Obstruction of left ureteropelvic junction (UPJ) due to stone    Kidney stone    9/16:  Downgraded from ICU.     Septic shock, complicated UTI, Klebsiella bacteremia, L obs uropathy s/p nephrostomy, hx prostate Ca s/p removal... cont iv abx, Po regimen at dc per ID recs, will dc with nephrostomy tube and f/up with IR team OP as well as  for stone mgmt.     Lauri... improving... monitor.   Indwelling ring... looks like it was just for I/Os... remove for ToV.     Hx parkinsons  Pt/ot  Home regimen for chronic conditions  Dvt px with heparin.     Dispo likely home tomorrow with C.          Leroy Betancourt MD

## 2024-09-16 NOTE — CONSULTS
INFECTIOUS DISEASE INPATIENT INITIAL CONSULTATION    Referred By: Leroy Betancourt    Reason For Consult: septic shock, bacteremia, L renal pelvis stone s/p nephrostomy tube     HPI:  This is a 68 y.o. male with PMH of prostate CA s/p prostatectomy, HTN, DLD who presented with abd pain, vomiting, shaking chills.    Admitted on 9/12/24 and found to have septic shock due to Klebsiella pneumoniae/varriicola. Left obstructing renal stone s/p perc neph tube placement.    Afebrile last couple days. WBC coming down from peak about 30 to 17 now. Was on IV Zosyn and now IV CTX.     Has improved and out of the ICU on regular medical grant now. Feels better. No specific complaints for me today.     Past Medical History:    Allergies:  Compazine [prochlorperazine], Morphine, Phenergan [promethazine], and Reglan [metoclopramide hcl]     Vitals (Last 24 Hours):  Heart Rate:  [62-75]   Temp:  [36.5 °C (97.7 °F)-37 °C (98.6 °F)]   Resp:  [16-28]   BP: (108-137)/(58-90)   SpO2:  [90 %-97 %]      PHYSICAL EXAM:  Gen - NAD  Heart - RRR  Lungs - clear bilaterally, has some faint expiratory wheezing in the RUL and both lower lungs  Abd - soft, no ttp, BS present  Back - left perc neph tube with slight blood tinged urine, no purulence   - Salazar present  Skin - no rash    MEDS:    Current Facility-Administered Medications:     acetaminophen (Tylenol) tablet 650 mg, 650 mg, oral, q4h PRN, Henrietta Omalley MD    aspirin EC tablet 81 mg, 81 mg, oral, Daily, Henrietta Omalley MD, 81 mg at 09/15/24 0951    atorvastatin (Lipitor) tablet 40 mg, 40 mg, oral, Nightly, Henrietta Omalley MD, 40 mg at 09/15/24 2328    carbidopa-levodopa (Sinemet)  mg per tablet 1.5 tablet, 1.5 tablet, oral, 4x daily, Henrietta Omalley MD, 1.5 tablet at 09/16/24 0633    cefTRIAXone (Rocephin) in dextrose 5% IV 2 g, 2 g, intravenous, q24h, Nancy Anderson MD, Last Rate: 100 mL/hr at 09/16/24 0635, 2 g at 09/16/24 0635    famotidine (Pepcid) tablet 20 mg, 20 mg, oral, Daily,  Henrietta Omalley MD, 20 mg at 09/15/24 0951    fentaNYL PF (Sublimaze) injection, , , PRN, Fredis Sánchez MD, 50 mcg at 09/12/24 1027    heparin (porcine) injection 5,000 Units, 5,000 Units, subcutaneous, q8h, Henrietta Omalley MD, 5,000 Units at 09/16/24 0633    iohexol (OMNIPaque) 350 mg iodine/mL solution, , , PRN, Fredis Sánchez MD, 10 mL at 09/12/24 1047    ipratropium-albuteroL (Duo-Neb) 0.5-2.5 mg/3 mL nebulizer solution 3 mL, 3 mL, nebulization, q2h PRN, Henrietta Omalley MD    lidocaine (Xylocaine) 20 mg/mL (2 %) injection, , , PRN, Fredis Sánchez MD, 10 mL at 09/12/24 1022    oxygen (O2) therapy, , inhalation, Continuous PRN - O2/gases, Je Martinez, APRN-CNP, DNP, 2 L/min at 09/14/24 1916    oxygen (O2) therapy, , , Continuous PRN, Fredis Sánchez MD, Last Rate: 240,000 mL/hr at 09/14/24 0400, 4 L/min at 09/14/24 0400    polyethylene glycol (Glycolax, Miralax) packet 17 g, 17 g, oral, Daily, Henrietta Omalley MD    traZODone (Desyrel) tablet 100 mg, 100 mg, oral, Nightly PRN, Henrietta Omalley MD, 100 mg at 09/15/24 2331     LABS:  Lab Results   Component Value Date    WBC 17.0 (H) 09/16/2024    HGB 11.7 (L) 09/16/2024    HCT 34.9 (L) 09/16/2024     (H) 09/16/2024     (L) 09/16/2024      Results from last 72 hours   Lab Units 09/15/24  0246   SODIUM mmol/L 140   POTASSIUM mmol/L 3.9   CHLORIDE mmol/L 110*   CO2 mmol/L 25   BUN mg/dL 44*   CREATININE mg/dL 1.90*   GLUCOSE mg/dL 139*   CALCIUM mg/dL 7.2*   ANION GAP mmol/L 9*   EGFR mL/min/1.73m*2 38*     Results from last 72 hours   Lab Units 09/15/24  0246   ALBUMIN g/dL 2.6*     Estimated Creatinine Clearance: 44.6 mL/min (A) (by C-G formula based on SCr of 1.9 mg/dL (H)).       IMAGING:  CT A/P 9/12  IMPRESSION:  Obstructing 8 mm stone at the left UPJ with moderate to severe left  hydronephrosis.    Concentric bladder wall thickening which may represent cystitis.  Correlate with urinalysis.    Nonobstructing stones in the right kidney measure up to  near 5 mm.  Mild right hydronephrosis with no obstructing ureteral stones.  Nonobstructing stones in the left kidney measure up to 5 mm.    Nonspecific para-aortic lymph nodes measuring up to 1.1 cm, increased  compared to prior imaging.    CXR 9/12  IMPRESSION:  Pulmonary venous congestion. Resolved interstitial edema.    ASSESSMENT/PLAN:    Septic Shock - resolved  Complicated UTI due to Klebsiella  Bacteremia due to Klebsiella  Obstructive Uropathy Left Side - s/p left perc neph tube  Acute Renal Failure - improving, CrCl about 44, affects abx dosing    Improving infection on IV CTX 2g Q24H - continue while here.    When ready to go home can be on PO Cipro 500mg BID through 9/26/24 for 14D total abx therapy.    No adverse effects of abx such as rash/itching/diarrhea are apparent.    Will sign off. Please call back with questions. Thanks!    Cal Gaxiola MD  [facility]  Office #[phone]

## 2024-09-16 NOTE — PROGRESS NOTES
Occupational Therapy    Occupational Therapy Treatment    Name: Quinton Kennedy  MRN: [id number]  Department: [facility]  Room: [address] [place] [address]  Date: 09/16/24  Time Calculation  Start Time: 1105  Stop Time: 1135  Time Calculation (min): 30 min    Assessment:  Medical Staff Made Aware: Yes  End of Session Communication: Bedside nurse  End of Session Patient Position: Up in chair, Alarm off, caregiver present (Rn notified and ok with, wife present and educated)  Plan:  Treatment Interventions: ADL retraining, Functional transfer training, Endurance training  OT Frequency: 3 times per week  OT Discharge Recommendations: Low intensity level of continued care  Equipment Recommended upon Discharge: Wheeled walker  OT Recommended Transfer Status: Stand by assist  OT - OK to Discharge: Yes (per POC)    Subjective   Previous Visit Info:  OT Last Visit  OT Received On: 09/16/24  General:  General  Reason for Referral: 67 y/o M to ED with abdominal pain, vomiting, shivers, tachycardia, and hypotension; admitted with urosepsis d/t L sided uretoplevic junction obstruction from a kidney stone. Underwent urgent cystoscopy for L nephrostomy tube placement in IR on 9/12/24.  Family/Caregiver Present: Yes  Caregiver Feedback:  (Wife present)  Prior to Session Communication: Bedside nurse  Patient Position Received: Alarm on, Bed, 3 rail up  Preferred Learning Style: auditory, verbal  General Comment: Pt cooperative and compliant with session, mildly impulsive.  Precautions:  Medical Precautions: Fall precautions    Pain Assessment:  Pain Assessment  Pain Assessment: 0-10  0-10 (Numeric) Pain Score: 0 - No pain     Objective   Cognition:  Orientation Level: Oriented X4  Impulsive: Mildly  Processing Speed: Within funtional limits (slightly delayed d/t Parkinsons)  Activities of Daily Living: Grooming  Grooming Level of Assistance: Close supervision, Setup  Grooming Where Assessed: Standing sinkside  Grooming Comments: pt completed face washing,  hair brushing, and donned deodorant    UE Bathing  UE Bathing Level of Assistance: Maximum assistance, Minimal verbal cues  UE Bathing Where Assessed: Standing sinkside  UE Bathing Comments: assist to complete hair washing task    UE Dressing  UE Dressing Level of Assistance: Minimum assistance  UE Dressing Where Assessed: Other (Comment), Edge of bed  UE Dressing Comments: assist to don/doff gown    LE Dressing  LE Dressing: Yes  Sock Level of Assistance: Modified independent  LE Dressing Where Assessed: Edge of bed  LE Dressing Comments: assist to don/doff socks    Functional Standing Tolerance:     Bed Mobility/Transfers: Bed Mobility  Bed Mobility: Yes  Bed Mobility 1  Bed Mobility 1: Supine to sitting, Long sit  Level of Assistance 1: Close supervision    Transfers  Transfer: Yes  Transfer 1  Transfer From 1: Bed to  Transfer to 1: Stand  Technique 1: Sit to stand  Transfer Device 1: Walker, Gait belt  Transfer Level of Assistance 1: Contact guard  Trials/Comments 1: VC provided for correct hand placement  Transfers 2  Transfer From 2: Stand to  Transfer to 2: Chair with drop arm  Technique 2: Sit to stand  Transfer Device 2: Walker, Gait belt  Transfer Level of Assistance 2: Contact guard  Trials/Comments 2: VC provided for hand placement and safety awareness      Functional Mobility:  Functional Mobility  Functional Mobility Performed: Yes  Functional Mobility 1  Surface 1: Level tile  Device 1: Rolling walker  Functional Mobility Support Devices: Gait belt  Assistance 1: Contact guard  Comments 1: Pt completed functional mobility with FWW a short household distance at [facility] with VC for walker management d/t safety.    Outcome Measures:  [facility] Daily Activity  Putting on and taking off regular lower body clothing: None  Bathing (including washing, rinsing, drying): A lot  Putting on and taking off regular upper body clothing: A little  Toileting, which includes using toilet, bedpan or urinal: A little  Taking  care of personal grooming such as brushing teeth: A little  Eating Meals: None  Daily Activity - Total Score: 19    Education Documentation  Handouts, taught by SAMEER Aponte at 9/16/2024 11:49 AM.  Learner: Patient  Readiness: Acceptance  Method: Explanation, Demonstration  Response: Verbalizes Understanding, Demonstrated Understanding, Needs Reinforcement    Body Mechanics, taught by SAMEER Aponte at 9/16/2024 11:49 AM.  Learner: Patient  Readiness: Acceptance  Method: Explanation, Demonstration  Response: Verbalizes Understanding, Demonstrated Understanding, Needs Reinforcement    Precautions, taught by SAMEER Aponte at 9/16/2024 11:49 AM.  Learner: Patient  Readiness: Acceptance  Method: Explanation, Demonstration  Response: Verbalizes Understanding, Demonstrated Understanding, Needs Reinforcement    Home Exercise Program, taught by SAMEER Aponte at 9/16/2024 11:49 AM.  Learner: Patient  Readiness: Acceptance  Method: Explanation, Demonstration  Response: Verbalizes Understanding, Demonstrated Understanding, Needs Reinforcement    ADL Training, taught by SAMEER Aponte at 9/16/2024 11:49 AM.  Learner: Patient  Readiness: Acceptance  Method: Explanation, Demonstration  Response: Verbalizes Understanding, Demonstrated Understanding, Needs Reinforcement    Education Comments  No comments found.      Goals:  Encounter Problems       Encounter Problems (Active)       ADLs       Patient with complete upper body dressing with modified independent level of assistance donning and doffing all UE clothes with PRN adaptive equipment while edge of bed  (Progressing)       Start:  09/13/24    Expected End:  09/27/24            Patient with complete lower body dressing with supervision level of assistance donning and doffing all LE clothes  with PRN adaptive equipment while edge of bed  (Progressing)       Start:  09/13/24    Expected End:  09/27/24            Patient will complete  daily grooming tasks brushing teeth and washing face/hair with supervision level of assistance and PRN adaptive equipment while standing sinkside. (Progressing)       Start:  09/13/24    Expected End:  09/27/24            Patient will complete toileting including hygiene clothing management/hygiene with supervision level of assistance and grab bars. (Not Progressing)       Start:  09/13/24    Expected End:  09/27/24               MOBILITY       Patient will perform Functional mobility max Household distances/Community Distances with supervision level of assistance and least restrictive device in order to improve safety and functional mobility. (Progressing)       Start:  09/13/24    Expected End:  09/27/24               TRANSFERS       Patient will perform bed mobility independent level of assistance in order to improve safety and independence with mobility (Progressing)       Start:  09/13/24    Expected End:  09/27/24            Patient will complete functional transfer to all surfaces with least restrictive device with modified independent level of assistance. (Progressing)       Start:  09/13/24    Expected End:  09/27/24

## 2024-09-16 NOTE — PROGRESS NOTES
09/16/24 1121   Discharge Planning   Home or Post Acute Services In home services   Type of Home Care Services Home PT;Home OT;Home nursing visits   Expected Discharge Disposition Home H  ([facility])     PLAN/BARRIER: continue IV antibiotics and switch to oral upon discharge, labs  DISP: home with [facility]  ADOD: 1-3 days  Odalys Chacon RN

## 2024-09-16 NOTE — PROGRESS NOTES
"Quinton Kennedy is a 68 y.o. male on day 4 of admission presenting with Obstruction of left ureteropelvic junction (UPJ) due to stone.    Subjective   Feeling good today, sitting  up in the chair. Denies any flank pain, N/V, fever or chills.        Objective     Physical Exam    Constitutional:       General: He is not in acute distress.  Cardiovascular:      Rate and Rhythm: Normal rate and regular rhythm.   Pulmonary:      Effort: Pulmonary effort is normal. No respiratory distress.      Breath sounds: WNL  Abdominal:      General: Abdomen is flat. Bowel sounds are normal. There is no distension.      Palpations: Abdomen is soft, nontender, BS+. Left sided nephrostomy tube C/D/I. Dressing changed.  Neurological:      Mental Status: He is alert and oriented    Last Recorded Vitals  Blood pressure (!) 148/48, pulse 72, temperature 36.7 °C (98.1 °F), temperature source Temporal, resp. rate 18, height 1.829 m (6' 0.01\"), weight 98.5 kg (217 lb 2.5 oz), SpO2 97%.  Intake/Output last 3 Shifts:  I/O last 3 completed shifts:  In: 290 (2.9 mL/kg) [P.O.:240; IV Piggyback:50]  Out: 3240 (32.9 mL/kg) [Urine:3240 (0.9 mL/kg/hr)]  Weight: 98.5 kg     Relevant Results             Scheduled medications  aspirin, 81 mg, oral, Daily  atorvastatin, 40 mg, oral, Nightly  carbidopa-levodopa, 1.5 tablet, oral, 4x daily  cefTRIAXone, 2 g, intravenous, q24h  famotidine, 20 mg, oral, Daily  heparin (porcine), 5,000 Units, subcutaneous, q8h  polyethylene glycol, 17 g, oral, Daily      Continuous medications  oxygen, , Last Rate: 4 L/min (09/14/24 0400)      PRN medications  PRN medications: acetaminophen, fentaNYL PF, iohexol, ipratropium-albuteroL, lidocaine, oxygen, oxygen, traZODone  Results for orders placed or performed during the hospital encounter of 09/12/24 (from the past 24 hour(s))   Basic Metabolic Panel   Result Value Ref Range    Glucose 125 (H) 74 - 99 mg/dL    Sodium 142 136 - 145 mmol/L    Potassium 3.9 3.5 - 5.3 mmol/L    " Chloride 113 (H) 98 - 107 mmol/L    Bicarbonate 21 21 - 32 mmol/L    Anion Gap 12 10 - 20 mmol/L    Urea Nitrogen 35 (H) 6 - 23 mg/dL    Creatinine 1.38 (H) 0.50 - 1.30 mg/dL    eGFR 55 (L) >60 mL/min/1.73m*2    Calcium 7.3 (L) 8.6 - 10.3 mg/dL   CBC   Result Value Ref Range    WBC 17.0 (H) 4.4 - 11.3 x10*3/uL    nRBC 0.2 (H) 0.0 - 0.0 /100 WBCs    RBC 3.42 (L) 4.50 - 5.90 x10*6/uL    Hemoglobin 11.7 (L) 13.5 - 17.5 g/dL    Hematocrit 34.9 (L) 41.0 - 52.0 %     (H) 80 - 100 fL    MCH 34.2 (H) 26.0 - 34.0 pg    MCHC 33.5 32.0 - 36.0 g/dL    RDW 14.6 (H) 11.5 - 14.5 %    Platelets 109 (L) 150 - 450 x10*3/uL   Lactate   Result Value Ref Range    Lactate 1.4 0.4 - 2.0 mmol/L       CT abdomen pelvis w IV contrast    Result Date: 9/12/2024  Interpreted By:  Finkelstein, Evan, STUDY: CT ABDOMEN PELVIS W IV CONTRAST;  9/12/2024 1:37 am   INDICATION: Signs/Symptoms:Abdominal pain.     COMPARISON: CT abdomen pelvis 12/11/2015   ACCESSION NUMBER(S): [id number]   ORDERING CLINICIAN: DON MORTENSEN   TECHNIQUE: Axial CT images of the abdomen and pelvis with coronal and sagittal reconstructed images obtained after intravenous administration of 75 mL Omnipaque 350   FINDINGS: LOWER CHEST: Mild bibasilar atelectasis. There are coronary artery calcifications.   ABDOMEN:   LIVER: Normal attenuation and contour. BILE DUCTS: Normal caliber. GALLBLADDER: No calcified gallstones. No wall thickening. PORTAL VEIN: Patent SPLEEN: Unremarkable. PANCREAS: Unremarkable. ADRENALS: Unremarkable. KIDNEYS, URETERS and URINARY BLADDER: Nonobstructing stones in the right kidney measure up to near 5 mm. Mild right hydronephrosis. Normal caliber of the right ureter. Nonobstructing stones in the left kidney measure up to 5 mm. 8 mm obstructing stone at the left UPJ with moderate to severe left hydronephrosis and asymmetric left perinephric stranding. Concentric bladder wall thickening. REPRODUCTIVE ORGANS: No pelvic masses.   ABDOMINAL WALL:  Fat containing inguinal hernias. PERITONEUM: No ascites, free air or fluid collection.   BOWEL: No dilated bowel.  Normal appendix.   VESSELS: Moderate aortoiliac calcifications. No aortic aneurysm. RETROPERITONEUM: Para-aortic lymph nodes measure up to 1.1 cm, increased compared to prior imaging.   BONES: No acute osseous abnormality.       Obstructing 8 mm stone at the left UPJ with moderate to severe left hydronephrosis.   Concentric bladder wall thickening which may represent cystitis. Correlate with urinalysis.   Nonobstructing stones in the right kidney measure up to near 5 mm. Mild right hydronephrosis with no obstructing ureteral stones. Nonobstructing stones in the left kidney measure up to 5 mm.   Nonspecific para-aortic lymph nodes measuring up to 1.1 cm, increased compared to prior imaging.   MACRO: None.   Signed by: Evan Finkelstein 9/12/2024 1:58 AM Dictation workstation:   [id number]                 Assessment/Plan   This is a 68 year old male admitted to the ICU with urosepsis, secondary to an 8mm stone obstructing the left UPJ. Imaging was reviewed which demonstrates the above, with moderate hydronephrosis.      S/p left sided nephrostomy tube placement 9/12/24. Quinton reports feeling better. Denies flank pain. Creatinine has come down to 1.38, down from 2.95, WBC count trending down, plan to convert to PO abx for discharge. Is now on a med/surg floor and no longer in the ICU.  Will go home with the neph tube intact. Will need to follow up with urology for stone intervention.        Previously saw Dr. Nunn @ [facility] for prostate ca and previous kidney stones. Orders have been placed for neph tube exchange if he does not have stone intervention within the next 6-8 weeks.      Educated on care of drain. No need to flush. Dressing supplies provided for the patient. IR contact number provided. Will plan to put on the schedule for an 8 week neph tube exchange, I do anticipate he will see urology and  have a stone intervention and neph tube removed prior to this.    IR to sign off. Please contact us with any questions.       I spent 35 minutes in the professional and overall care of this patient.      Hugh Lopez, APRN-CNP

## 2024-09-16 NOTE — CARE PLAN
Problem: Pain - Adult  Goal: Verbalizes/displays adequate comfort level or baseline comfort level  Outcome: Progressing     Problem: Safety - Adult  Goal: Free from fall injury  Outcome: Progressing     Problem: Discharge Planning  Goal: Discharge to home or other facility with appropriate resources  Outcome: Progressing     Problem: Chronic Conditions and Co-morbidities  Goal: Patient's chronic conditions and co-morbidity symptoms are monitored and maintained or improved  Outcome: Progressing   The patient's goals for the shift include to feel better    The clinical goals for the shift include Maintain pt safety/comfort; monitor labs/vitals; ostomy and catheter care

## 2024-09-16 NOTE — PROGRESS NOTES
Physical Therapy    Physical Therapy Treatment    Patient Name: Quinton Kennedy  MRN: [id number]  Department: [facility] A 7  Room: [address] [place] [address]  Today's Date: 9/16/2024  Time Calculation  Start Time: 1502  Stop Time: 1526  Time Calculation (min): 24 min         Assessment/Plan   PT Assessment  End of Session Communication: Bedside nurse  Assessment Comment: pt able to walk out in the hallway  End of Session Patient Position: Up in chair, Alarm off, not on at start of session  PT Plan  Inpatient/Swing Bed or Outpatient: Inpatient  PT Plan  Treatment/Interventions: Transfer training, Gait training  PT Plan: Ongoing PT  PT Frequency: 4 times per week  PT Discharge Recommendations: Low intensity level of continued care  Equipment Recommended upon Discharge: Wheeled walker  PT Recommended Transfer Status: Assist x1  PT - OK to Discharge: Yes (per POC)      General Visit Information:   PT  Visit  PT Received On: 09/16/24  General  Reason for Referral: 69 y/o M to ED with abdominal pain, vomiting, shivers, tachycardia, and hypotension; admitted with urosepsis d/t L sided uretoplevic junction obstruction from a kidney stone. Underwent urgent cystoscopy for L nephrostomy tube placement in IR on 9/12/24.  Referred By: DENNIS Lemus  Family/Caregiver Present: Yes  Caregiver Feedback: Spouse is present and supportive  Prior to Session Communication: Bedside nurse  Patient Position Received: Up in chair, Alarm off, not on at start of session  Preferred Learning Style: auditory, verbal  General Comment: pt agreeable to tx    Subjective   Precautions:  Precautions  Medical Precautions: Fall precautions    Vital Signs (Past 2hrs)        Date/Time Vitals Session Patient Position Pulse Resp SpO2 BP MAP (mmHg)    09/16/24 1523 --  --  75  17  95 %  143/73  96                         Objective   Pain:  Pain Assessment  0-10 (Numeric) Pain Score: 0 - No pain  Cognition:  Cognition  Overall Cognitive Status: Within Functional Limits  Coordination:      Postural Control:  Static Standing Balance  Static Standing-Comment/Number of Minutes: pt performed static standing balance with and without UE support total time x4min  Extremity/Trunk Assessments:    Activity Tolerance:     Treatments:            Ambulation/Gait Training  Ambulation/Gait Training Performed: Yes  Ambulation/Gait Training 1  Surface 1: Level tile  Device 1: Rolling walker  Gait Support Devices: Gait belt  Assistance 1: Close supervision  Comments/Distance (ft) 1: 150x1, 250x1 (pt performed with step through gait pattern.  no overt LOB.  x2 short standing rest break)  Transfer 1  Technique 1: Sit to stand, Stand to sit  Transfer Device 1: Walker  Transfer Level of Assistance 1: Close supervision  Trials/Comments 1: vc/tc for hand placment on solid sitting surface and not RW. x2    Outcome Measures:  [facility] Basic Mobility  Turning from your back to your side while in a flat bed without using bedrails: A little  Moving from lying on your back to sitting on the side of a flat bed without using bedrails: A little  Moving to and from bed to chair (including a wheelchair): A little  Standing up from a chair using your arms (e.g. wheelchair or bedside chair): A little  To walk in hospital room: A little  Climbing 3-5 steps with railing: A little  Basic Mobility - Total Score: 18    Education Documentation  Mobility Training, taught by Harrison Gregg PTA at 9/16/2024  4:21 PM.  Learner: Patient  Readiness: Acceptance  Method: Explanation  Response: Verbalizes Understanding    Education Comments  No comments found.        OP EDUCATION:  Outpatient Education  Education Comment: educated pt on importance of OOB activity    Encounter Problems       Encounter Problems (Active)       PT Problem       Patient will be able to independently maintain dynamic standing balance during walking and reaching. (Progressing)       Start:  09/13/24    Expected End:  09/27/24         Goal Note       Patient will be able to  maintain dynamic standing balance              Patient will ambulate >150' on level surfaces using LRD or no device independently. (Progressing)       Start:  09/13/24    Expected End:  09/27/24         Goal Note       Patient will ambulate distance              Patient will ascend and descend 1 flight of stairs with rail and supervision. (Not Progressing)       Start:  09/13/24    Expected End:  09/27/24            Patient will perform sit<->stand transfer from EOB and chair independently. (Progressing)       Start:  09/13/24    Expected End:  09/27/24            Patient will perform supine to sit on flat bed with no rails independently demonstrating control. (Progressing)       Start:  09/13/24    Expected End:  09/27/24            Patient will ascend and descend 2 steps without rail with stand by assist. (Not Progressing)       Start:  09/13/24    Expected End:  09/27/24                   Pain - Adult

## 2024-09-16 NOTE — CARE PLAN
The patient's goals for the shift include to feel better    The clinical goals for the shift include Maintain pt safety/comfort; monitor labs/vitals; ostomy and catheter care    Problem: Pain - Adult  Goal: Verbalizes/displays adequate comfort level or baseline comfort level  Outcome: Progressing     Problem: Safety - Adult  Goal: Free from fall injury  Outcome: Progressing     Problem: Discharge Planning  Goal: Discharge to home or other facility with appropriate resources  Outcome: Progressing     Problem: Chronic Conditions and Co-morbidities  Goal: Patient's chronic conditions and co-morbidity symptoms are monitored and maintained or improved  Outcome: Progressing     Problem: Skin  Goal: Decreased wound size/increased tissue granulation at next dressing change  Outcome: Progressing  Goal: Participates in plan/prevention/treatment measures  Outcome: Progressing  Goal: Prevent/manage excess moisture  Outcome: Progressing  Goal: Prevent/minimize sheer/friction injuries  Outcome: Progressing  Goal: Promote/optimize nutrition  Outcome: Progressing  Goal: Promote skin healing  Outcome: Progressing

## 2024-09-17 ENCOUNTER — HOME HEALTH ADMISSION (OUTPATIENT)
Dept: HOME HEALTH SERVICES | Facility: HOME HEALTH | Age: 69
End: 2024-09-17
Payer: MEDICARE

## 2024-09-17 ENCOUNTER — DOCUMENTATION (OUTPATIENT)
Dept: HOME HEALTH SERVICES | Facility: HOME HEALTH | Age: 69
End: 2024-09-17
Payer: MEDICARE

## 2024-09-17 VITALS
RESPIRATION RATE: 18 BRPM | DIASTOLIC BLOOD PRESSURE: 63 MMHG | WEIGHT: 217.15 LBS | TEMPERATURE: 98.7 F | HEIGHT: 72 IN | OXYGEN SATURATION: 97 % | BODY MASS INDEX: 29.41 KG/M2 | SYSTOLIC BLOOD PRESSURE: 112 MMHG | HEART RATE: 68 BPM

## 2024-09-17 LAB
ANION GAP SERPL CALC-SCNC: 12 MMOL/L (ref 10–20)
BUN SERPL-MCNC: 24 MG/DL (ref 6–23)
CALCIUM SERPL-MCNC: 7.1 MG/DL (ref 8.6–10.3)
CHLORIDE SERPL-SCNC: 111 MMOL/L (ref 98–107)
CO2 SERPL-SCNC: 22 MMOL/L (ref 21–32)
CREAT SERPL-MCNC: 1.19 MG/DL (ref 0.5–1.3)
EGFRCR SERPLBLD CKD-EPI 2021: 66 ML/MIN/1.73M*2
ERYTHROCYTE [DISTWIDTH] IN BLOOD BY AUTOMATED COUNT: 14.6 % (ref 11.5–14.5)
GLUCOSE SERPL-MCNC: 126 MG/DL (ref 74–99)
HCT VFR BLD AUTO: 32.8 % (ref 41–52)
HGB BLD-MCNC: 11 G/DL (ref 13.5–17.5)
MCH RBC QN AUTO: 33.7 PG (ref 26–34)
MCHC RBC AUTO-ENTMCNC: 33.5 G/DL (ref 32–36)
MCV RBC AUTO: 101 FL (ref 80–100)
NRBC BLD-RTO: 0.2 /100 WBCS (ref 0–0)
PLATELET # BLD AUTO: 136 X10*3/UL (ref 150–450)
POTASSIUM SERPL-SCNC: 3.9 MMOL/L (ref 3.5–5.3)
RBC # BLD AUTO: 3.26 X10*6/UL (ref 4.5–5.9)
SODIUM SERPL-SCNC: 141 MMOL/L (ref 136–145)
WBC # BLD AUTO: 14 X10*3/UL (ref 4.4–11.3)

## 2024-09-17 PROCEDURE — 99239 HOSP IP/OBS DSCHRG MGMT >30: CPT | Performed by: INTERNAL MEDICINE

## 2024-09-17 PROCEDURE — 85027 COMPLETE CBC AUTOMATED: CPT | Performed by: INTERNAL MEDICINE

## 2024-09-17 PROCEDURE — 36415 COLL VENOUS BLD VENIPUNCTURE: CPT | Performed by: INTERNAL MEDICINE

## 2024-09-17 PROCEDURE — 97530 THERAPEUTIC ACTIVITIES: CPT | Mod: GP,CQ

## 2024-09-17 PROCEDURE — 2500000004 HC RX 250 GENERAL PHARMACY W/ HCPCS (ALT 636 FOR OP/ED): Performed by: ANESTHESIOLOGY

## 2024-09-17 PROCEDURE — 97116 GAIT TRAINING THERAPY: CPT | Mod: GP,CQ

## 2024-09-17 PROCEDURE — 2500000001 HC RX 250 WO HCPCS SELF ADMINISTERED DRUGS (ALT 637 FOR MEDICARE OP): Performed by: HOSPITALIST

## 2024-09-17 PROCEDURE — 80048 BASIC METABOLIC PNL TOTAL CA: CPT | Performed by: INTERNAL MEDICINE

## 2024-09-17 PROCEDURE — 2500000004 HC RX 250 GENERAL PHARMACY W/ HCPCS (ALT 636 FOR OP/ED): Performed by: HOSPITALIST

## 2024-09-17 RX ORDER — CIPROFLOXACIN 500 MG/1
500 TABLET ORAL 2 TIMES DAILY
Qty: 14 TABLET | Refills: 0 | Status: SHIPPED | OUTPATIENT
Start: 2024-09-17 | End: 2024-09-24

## 2024-09-17 RX ADMIN — CARBIDOPA AND LEVODOPA 1.5 TABLET: 25; 100 TABLET ORAL at 13:05

## 2024-09-17 RX ADMIN — CEFTRIAXONE 2 G: 2 INJECTION, POWDER, FOR SOLUTION INTRAMUSCULAR; INTRAVENOUS at 03:21

## 2024-09-17 RX ADMIN — ASPIRIN 81 MG: 81 TABLET, COATED ORAL at 08:55

## 2024-09-17 RX ADMIN — FAMOTIDINE 20 MG: 20 TABLET, FILM COATED ORAL at 08:55

## 2024-09-17 RX ADMIN — HEPARIN SODIUM 5000 UNITS: 5000 INJECTION INTRAVENOUS; SUBCUTANEOUS at 06:49

## 2024-09-17 RX ADMIN — CARBIDOPA AND LEVODOPA 1.5 TABLET: 25; 100 TABLET ORAL at 06:49

## 2024-09-17 ASSESSMENT — COGNITIVE AND FUNCTIONAL STATUS - GENERAL
CLIMB 3 TO 5 STEPS WITH RAILING: A LITTLE
TURNING FROM BACK TO SIDE WHILE IN FLAT BAD: A LITTLE
WALKING IN HOSPITAL ROOM: A LITTLE
MOVING FROM LYING ON BACK TO SITTING ON SIDE OF FLAT BED WITH BEDRAILS: A LITTLE
WALKING IN HOSPITAL ROOM: A LITTLE
MOVING TO AND FROM BED TO CHAIR: A LITTLE
TURNING FROM BACK TO SIDE WHILE IN FLAT BAD: A LITTLE
MOBILITY SCORE: 18
MOVING TO AND FROM BED TO CHAIR: A LITTLE
MOBILITY SCORE: 19
STANDING UP FROM CHAIR USING ARMS: A LITTLE
STANDING UP FROM CHAIR USING ARMS: A LITTLE
CLIMB 3 TO 5 STEPS WITH RAILING: A LITTLE

## 2024-09-17 ASSESSMENT — PAIN SCALES - GENERAL
PAINLEVEL_OUTOF10: 0 - NO PAIN

## 2024-09-17 ASSESSMENT — PAIN - FUNCTIONAL ASSESSMENT: PAIN_FUNCTIONAL_ASSESSMENT: 0-10

## 2024-09-17 NOTE — CARE PLAN
The patient's goals for the shift include to feel better    The clinical goals for the shift include maintain patient safety and manage patient's pain throughout the shift    Over the shift, the patient did not make progress toward the following goals. Barriers to progression include. Recommendations to address these barriers include.

## 2024-09-17 NOTE — PROGRESS NOTES
Physical Therapy    Physical Therapy Treatment    Patient Name: Quinton Kennedy  MRN: [id number]  Department: [facility] A 7  Room: [address] [place] [address]  Today's Date: 9/17/2024  Time Calculation  Start Time: 1118  Stop Time: 1141  Time Calculation (min): 23 min         Assessment/Plan   PT Assessment  End of Session Communication: Bedside nurse  Assessment Comment: able to walk in hallway and compelte stair training  End of Session Patient Position: Up in chair, Alarm off, not on at start of session  PT Plan  Inpatient/Swing Bed or Outpatient: Inpatient  PT Plan  Treatment/Interventions: Transfer training, Stair training, Gait training  PT Plan: Ongoing PT  PT Frequency: 4 times per week  PT Discharge Recommendations: Low intensity level of continued care  Equipment Recommended upon Discharge: Wheeled walker  PT Recommended Transfer Status: Assist x1  PT - OK to Discharge: Yes (per POC)      General Visit Information:   PT  Visit  PT Received On: 09/17/24  General  Reason for Referral: 67 y/o M to ED with abdominal pain, vomiting, shivers, tachycardia, and hypotension; admitted with urosepsis d/t L sided uretoplevic junction obstruction from a kidney stone. Underwent urgent cystoscopy for L nephrostomy tube placement in IR on 9/12/24.  Referred By: DENNIS Lemus  Family/Caregiver Present: Yes  Caregiver Feedback: Spouse is present and supportive  Prior to Session Communication: Bedside nurse  Patient Position Received: Up in chair, Alarm off, not on at start of session  Preferred Learning Style: auditory, verbal  General Comment: pt agreeable to tx    Subjective   Precautions:  Precautions  Medical Precautions: Fall precautions            Objective   Pain:  Pain Assessment  0-10 (Numeric) Pain Score: 0 - No pain  Cognition:  Cognition  Overall Cognitive Status: Within Functional Limits  Coordination:     Postural Control:  Static Standing Balance  Static Standing-Comment/Number of Minutes: pt performed static standing balance without UE  support x9 min , SBA.  no overt LOB  Extremity/Trunk Assessments:    Activity Tolerance:     Treatments:                 Ambulation/Gait Training  Ambulation/Gait Training Performed: Yes  Ambulation/Gait Training 1  Surface 1: Level tile  Device 1: Rolling walker  Gait Support Devices: Gait belt  Comments/Distance (ft) 1: 125x1, 275x2 (pt performed 1st trial with AD, x2 longer walks with out AD.  pt had x1 min LOB corrected with use of gait belt.  VC for forward gaze throughout.  increased time req.)  Transfer 1  Technique 1: Sit to stand, Stand to sit  Transfer Device 1: Walker  Transfer Level of Assistance 1: Close supervision    Stairs  Stairs: Yes  Stairs  Comment/Number of Steps 1: pt performed 13 steps with 1 rail and wall for support. step over gait pattern.  no overt LOB.  pt completed slowly with SBA/CGA    Outcome Measures:  [facility] Basic Mobility  Turning from your back to your side while in a flat bed without using bedrails: None  Moving from lying on your back to sitting on the side of a flat bed without using bedrails: A little  Moving to and from bed to chair (including a wheelchair): A little  Standing up from a chair using your arms (e.g. wheelchair or bedside chair): A little  To walk in hospital room: A little  Climbing 3-5 steps with railing: A little  Basic Mobility - Total Score: 19    Education Documentation  Mobility Training, taught by Harrison Gregg PTA at 9/17/2024  3:01 PM.  Learner: Patient  Readiness: Acceptance  Method: Explanation  Response: Verbalizes Understanding    Education Comments  No comments found.        OP EDUCATION:  Outpatient Education  Education Comment: educated pt on importance of OOB activity    Encounter Problems       Encounter Problems (Active)       PT Problem       Patient will be able to independently maintain dynamic standing balance during walking and reaching. (Progressing)       Start:  09/13/24    Expected End:  09/27/24         Goal Note       Patient  will be able to maintain dynamic standing balance              Patient will ambulate >150' on level surfaces using LRD or no device independently. (Progressing)       Start:  09/13/24    Expected End:  09/27/24         Goal Note       Patient will ambulate  distance using walker with modified independent              Patient will ascend and descend 1 flight of stairs with rail and supervision. (Not Progressing)       Start:  09/13/24    Expected End:  09/27/24            Patient will perform sit<->stand transfer from EOB and chair independently. (Progressing)       Start:  09/13/24    Expected End:  09/27/24            Patient will perform supine to sit on flat bed with no rails independently demonstrating control. (Progressing)       Start:  09/13/24    Expected End:  09/27/24            Patient will ascend and descend 2 steps without rail with stand by assist. (Not Progressing)       Start:  09/13/24    Expected End:  09/27/24                   Pain - Adult

## 2024-09-17 NOTE — CARE PLAN
The patient's goals for the shift include to feel better    The clinical goals for the shift include maintain patient safety and manage patient's pain throughout the shift    Over the shift, the patient did make progress toward the goals.

## 2024-09-17 NOTE — PROGRESS NOTES
09/17/24 1230   Discharge Planning   Home or Post Acute Services In home services   Type of Home Care Services Home nursing visits;Home OT;Home PT   Expected Discharge Disposition Home H     Met with patient and his wife at the bedside.  When medically cleared, patient will be discharged home with [facility] for PT,OT, SN  DISP: home with [facility]  ADOD: today or tomorrow  Odalys Chacon RN     9/17/24 @ 3278  Patient's PCP is a [facility] doctor.  Per patient

## 2024-09-22 NOTE — DISCHARGE SUMMARY
Discharge Diagnosis  Obstruction of left ureteropelvic junction (UPJ) due to stone    Issues Requiring Follow-Up  IR and uro for perc neph tube and definitve stone treatment     Discharge Meds     Your medication list        START taking these medications        Instructions Last Dose Given Next Dose Due   ciprofloxacin 500 mg tablet  Commonly known as: Cipro      Take 1 tablet (500 mg) by mouth 2 times a day for 7 days.              CONTINUE taking these medications        Instructions Last Dose Given Next Dose Due   allopurinol 100 mg tablet  Commonly known as: Zyloprim           aspirin 81 mg EC tablet           atorvastatin 40 mg tablet  Commonly known as: Lipitor           carbidopa-levodopa  mg disintegrating tablet  Commonly known as: Parcopa           Fish OiL 300-1,000 mg capsule  Generic drug: omega-3           lisinopril 10 mg tablet           pyridoxine 100 mg tablet  Commonly known as: Vitamin B-6           traZODone 50 mg tablet  Commonly known as: Desyrel           Vitamin D3 50 mcg (2,000 unit) capsule  Generic drug: cholecalciferol                     Where to Get Your Medications        These medications were sent to [facility]/pharmacy #[id number] - [place], [place] - [address] [facility] [address] [facility] [address]  [address], [facility] [address]      Phone: [phone]   ciprofloxacin 500 mg tablet         Test Results Pending At Discharge  Pending Labs       Order Current Status    Extra Urine Gray Tube Collected (09/13/24 1848)    Urinalysis with Reflex Culture and Microscopic In process            Procedures  Procedure(s):  *Case cancelled-patient condition changed 9-12-24 JE*Left Cystoscopy with Insertion Stent Ureter     Hospital Course   Quinton was admitted to hospital with obstructive ureteral stone.  He was evaluated by the urology team as well as the interventional radiology team.   He did have a PERC nephrostomy tube placed.  Unfortunately did require a stay in the  "ICU as he did become bacteremic and hypotensive.  He was evaluated by the infectious disease team and kept on IV antibiotics and will need to finish course of oral antibiotics at discharge.  He will be discharging home with the percutaneous nephrostomy tubes with need to follow-up closely with urology as well as interventional radiology.  At this time he is otherwise hemodynamically stable and appropriate for discharge.  This plan was discussed with him and his wife and they are both in agreement.  All questions were answered.    Blood pressure 112/63, pulse 68, temperature 37.1 °C (98.7 °F), temperature source Oral, resp. rate 18, height 1.829 m (6' 0.01\"), weight 98.5 kg (217 lb 2.5 oz), SpO2 97%.  Pertinent Physical Exam At Time of Discharge  Physical Exam  Constitutional:       Appearance: Normal appearance.   Cardiovascular:      Rate and Rhythm: Normal rate and regular rhythm.   Pulmonary:      Effort: Pulmonary effort is normal.      Breath sounds: Normal breath sounds.   Abdominal:      General: Abdomen is flat. Bowel sounds are normal.      Palpations: Abdomen is soft.      Comments: Nephrostomy tubes in place    Musculoskeletal:      Cervical back: Normal range of motion.   Skin:     General: Skin is warm.   Neurological:      General: No focal deficit present.      Mental Status: He is alert and oriented to person, place, and time. Mental status is at baseline.   Psychiatric:         Mood and Affect: Mood normal.         Behavior: Behavior normal.         Thought Content: Thought content normal.         Judgment: Judgment normal.         Outpatient Follow-Up  No future appointments.      Ariel Christiansen DO FACP     Time spent during this discharge: >30 min        "

## 2024-10-03 ENCOUNTER — HOSPITAL ENCOUNTER (OUTPATIENT)
Dept: CARDIOLOGY | Facility: HOSPITAL | Age: 69
Discharge: HOME | End: 2024-10-03
Payer: MEDICARE

## 2024-10-03 ENCOUNTER — PREP FOR PROCEDURE (OUTPATIENT)
Dept: RADIOLOGY | Facility: HOSPITAL | Age: 69
End: 2024-10-03
Payer: MEDICARE

## 2024-10-03 VITALS
SYSTOLIC BLOOD PRESSURE: 143 MMHG | BODY MASS INDEX: 26.85 KG/M2 | TEMPERATURE: 96.8 F | WEIGHT: 198 LBS | DIASTOLIC BLOOD PRESSURE: 71 MMHG | HEART RATE: 77 BPM | OXYGEN SATURATION: 96 % | RESPIRATION RATE: 12 BRPM

## 2024-10-03 DIAGNOSIS — N20.1 OBSTRUCTION OF LEFT URETEROPELVIC JUNCTION (UPJ) DUE TO STONE: Primary | ICD-10-CM

## 2024-10-03 DIAGNOSIS — N20.1 OBSTRUCTION OF LEFT URETEROPELVIC JUNCTION (UPJ) DUE TO STONE: ICD-10-CM

## 2024-10-03 PROCEDURE — 99213 OFFICE O/P EST LOW 20 MIN: CPT

## 2024-10-03 ASSESSMENT — PAIN - FUNCTIONAL ASSESSMENT: PAIN_FUNCTIONAL_ASSESSMENT: 0-10

## 2024-10-03 ASSESSMENT — PAIN SCALES - GENERAL: PAINLEVEL_OUTOF10: 0 - NO PAIN

## 2024-10-03 NOTE — CONSULTS
Consults    Reason For Consult  Nephrostomy tube evaluation    History Of Present Illness  Quinton Kennedy is a 69 y.o. male presenting with his wife for a neph tube insertion site evaluation. Was recently admitted in early September for urosepsis with an 8 mm obstructing UPJ stone. Required urgent nephrostomy tube to be placed 9/12/2024. Was discharged from [facility]. I spoke to his wife (Jan) today regarding concern of a low grade fever (99.0 degrees F) yesterday and redness around the tube insertion site. He endorses some urinary frequency which has been present since the tube has been placed and seems to be improving. Currently, Quinton denies any fever, chills, N/V, flank pain, dysuria, urinary urgency, abdominal pain, pain purulent discharge or redness at the tube insertion site.      Past Medical History  He has a past medical history of Coronary artery disease, Kidney stones, and Parkinson's disease (Multi).    Surgical History  He has a past surgical history that includes Other surgical history (07/26/2013) and Shoulder surgery (07/26/2013).     Social History  He reports that he has never smoked. He has never used smokeless tobacco. No history on file for alcohol use and drug use.    Family History  No family history on file.     Allergies  Compazine [prochlorperazine], Morphine, Phenergan [promethazine], and Reglan [metoclopramide hcl]    MEDS:    Current Outpatient Medications:     allopurinol (Zyloprim) 100 mg tablet, Take 1 tablet (100 mg) by mouth once daily., Disp: , Rfl:     aspirin 81 mg EC tablet, Take 1 tablet (81 mg) by mouth once daily., Disp: , Rfl:     atorvastatin (Lipitor) 40 mg tablet, Take 1 tablet (40 mg) by mouth once daily at bedtime., Disp: , Rfl:     carbidopa-levodopa (Parcopa)  mg disintegrating tablet, Take 1.5 tablets by mouth 4 times a day. 8a/2p/8p, Disp: , Rfl:     cholecalciferol (Vitamin D3) 50 mcg (2,000 unit) capsule, Take 1 capsule (50 mcg) by mouth once daily., Disp: ,  "Rfl:     omega-3 (Fish OiL) 300-1,000 mg capsule, Take 1 capsule (1,000 mg) by mouth once daily., Disp: , Rfl:     pyridoxine (Vitamin B-6) 100 mg tablet, Take 1 tablet (100 mg) by mouth once daily., Disp: , Rfl:     traZODone (Desyrel) 50 mg tablet, Take 1 tablet (50 mg) by mouth once daily at bedtime., Disp: , Rfl:     lisinopril 10 mg tablet, Take 1 tablet (10 mg) by mouth once daily., Disp: , Rfl:     Review of Systems  Respiratory ROS: no cough, shortness of breath, or wheezing  Cardiovascular ROS: no chest pain or dyspnea on exertion  Gastrointestinal ROS: no abdominal pain, change in bowel habits, or black or bloody stools  Neurological ROS: negative     Last Recorded Vitals  /71   Pulse 77   Temp 36 °C (96.8 °F)   Resp 12   Wt 89.8 kg (198 lb)   SpO2 96%      Physical Exam  Orientation: oriented to person, place, time, and general circumstances  HEENT: normocephalic, atraumatic  Pulm: clear to auscultation bilaterally, no wheezes, good air entry  Cardiac: Regular rate and rhythm or without murmur or extra heart sounds  GI:  pink excoriated tissue at the securing suture of the neph tube insertion site. No signs of infection including warmth, discharge/drainage, or pain. Urine in the neph bag is clear yellow.   Pulses: peripheral pulses symmetrical    Relevant Results    LABS:  Lab Results   Component Value Date    WBC 14.0 (H) 09/17/2024    HGB 11.0 (L) 09/17/2024    HCT 32.8 (L) 09/17/2024     (H) 09/17/2024     (L) 09/17/2024                    No lab exists for component: \"PT\"    MICRO:  No results found for the last 14 days.      IMAGING:   IR CVC check    (Results Pending)          Assessment/Plan     No signs of active infection in or around the nephrostomy tube. Continue current treatment. I reassured Quinton and Owen that they are taking good care of the neph tube. He has a surgery scheduled with urology at [facility] on 10/15. His urologist does not plan to remove the tube. I will " plan to reach out to his urologist ( Dr. Wiggins) to determine if he plans to remove the neph tube at a later date or if he plans to defer to .     Updated on s/s of infection and what to monitor for. I do not believe he exhibits any s/s of systemic infection. Encouraged Owen to monitor his vitals and temp and when to seek expert medical care.     Please reach out to  IR for any concerns.     Hugh Lopez, APRN-CNP    Time : Billing Time  Prep time on date of the patient encounter: 10 minutes.   Time spent directly with patient/family/caregiver: 15 minutes.   Documentation time: 10 minutes.   Total time (minutes):  35 minutes  Time Spent with this Patient (minutes).  More than 50% of This Time was Spent in Counseling and/or Coordination of Care